# Patient Record
Sex: MALE | Race: WHITE | Employment: FULL TIME | URBAN - METROPOLITAN AREA
[De-identification: names, ages, dates, MRNs, and addresses within clinical notes are randomized per-mention and may not be internally consistent; named-entity substitution may affect disease eponyms.]

---

## 2017-01-25 ENCOUNTER — OFFICE VISIT (OUTPATIENT)
Dept: FAMILY MEDICINE CLINIC | Age: 57
End: 2017-01-25

## 2017-01-25 VITALS
TEMPERATURE: 97.8 F | HEART RATE: 63 BPM | SYSTOLIC BLOOD PRESSURE: 143 MMHG | OXYGEN SATURATION: 99 % | RESPIRATION RATE: 18 BRPM | DIASTOLIC BLOOD PRESSURE: 88 MMHG | HEIGHT: 70 IN | WEIGHT: 251 LBS | BODY MASS INDEX: 35.93 KG/M2

## 2017-01-25 DIAGNOSIS — Z00.00 HEALTH CARE MAINTENANCE: Primary | ICD-10-CM

## 2017-01-25 DIAGNOSIS — M10.9 GOUT, UNSPECIFIED CAUSE, UNSPECIFIED CHRONICITY, UNSPECIFIED SITE: ICD-10-CM

## 2017-01-25 DIAGNOSIS — E78.9 LIPID DISORDER: ICD-10-CM

## 2017-01-25 DIAGNOSIS — Z12.5 PROSTATE CANCER SCREENING: ICD-10-CM

## 2017-01-25 DIAGNOSIS — Z12.11 COLON CANCER SCREENING: ICD-10-CM

## 2017-01-25 DIAGNOSIS — I10 ESSENTIAL HYPERTENSION: ICD-10-CM

## 2017-01-25 DIAGNOSIS — R73.9 HYPERGLYCEMIA: ICD-10-CM

## 2017-01-25 LAB
ALBUMIN UR QL STRIP: 150 MG/L
CREATININE, URINE POC: 100 MG/DL
MICROALBUMIN/CREAT RATIO POC: >300 MG/G

## 2017-01-25 RX ORDER — LISINOPRIL 20 MG/1
TABLET ORAL
Qty: 90 TAB | Refills: 3 | Status: SHIPPED | OUTPATIENT
Start: 2017-01-25 | End: 2017-08-31 | Stop reason: ALTCHOICE

## 2017-01-25 RX ORDER — PRAVASTATIN SODIUM 40 MG/1
40 TABLET ORAL
Qty: 90 TAB | Refills: 3 | Status: SHIPPED | OUTPATIENT
Start: 2017-01-25 | End: 2017-08-31 | Stop reason: SDUPTHER

## 2017-01-25 RX ORDER — GUAIFENESIN 100 MG/5ML
81 LIQUID (ML) ORAL DAILY
Qty: 90 TAB | Refills: 3
Start: 2017-01-25 | End: 2019-05-24

## 2017-01-25 RX ORDER — ALLOPURINOL 300 MG/1
TABLET ORAL
Qty: 90 TAB | Refills: 3 | Status: SHIPPED | OUTPATIENT
Start: 2017-01-25 | End: 2017-08-31 | Stop reason: SDUPTHER

## 2017-01-25 NOTE — PROGRESS NOTES
Rylan Short is a 64 y.o. male      Issues discussed today include:        Data reviewed or ordered today:  Labs today    WELLNESS 2017    PSA:  2017  AAA screen:   never smoker    Hearing screen:   done  Vision screening:   done  Depression screening:   done  Fall assessment:   done    Colonoscopy:  Done age 46 normal per patient  FOBT:  2017  TDAP:  2014  Flu shot:  Avani Berry   Eye exam:  2016  EK    FTF for DME:  done:  none  Advanced directive:  Full code  Specialists:  Holly Bolanos    In general, I advise patients to be as active as possible. I believe exercise is the key to long life and good health. The current recommendation is for individuals to exercise for 150 minutes each week (in other words 30 minutes 5 days a week). Exercise should be vigorous enough to work up a sweat. These activities include brisk walking, running, tennis, swimming, weight-lifting, etc.     I usually tell folks that work is work and exercise is exercise. Each of these activities has a different goal.  Even though you may be active at work, it may not be aerobically adequate. So build dedicated exercise time into your weekly routine. If a patient drinks alcohol, I suggest that a male drink only 2 beers (or glasses of wine, or shots of liquor) in any 24 hour period ( and not daily). For females, the limits are one drink per 24 hours (and not daily). After these limits, the toxic effects of alcohol consumption start to manifest.     Avoid tobacco products. I may provide separate information discussing specific smoking cessation instructions if needed. I suggest a wellness exam yearly during your birth month to update health maintenance issues such as fasting labs, EKGs and other studies, appropriate cancer screenings,  immunizations, etc.      I suggest a yearly flu shot    Please call Rojelio Oakes to help arrange and authorize any tests or referrals.   Her # is [phone]         Other problems include:  Patient Active Problem List   Diagnosis Code    Appendicitis K37    Hernia K46.9    FH: CAD (coronary artery disease) Z82.49    HTN (hypertension) I10    Gout M10.9    Cataract H26.9    History of normal resting EKG [id number]    S/P colonoscopy Z98.890    Microalbuminuria R80.9    Hyperglycemia R73.9    Rheumatoid factor positive R76.8    Lipid disorder E78.9       Medications:  Current Outpatient Prescriptions   Medication Sig Dispense Refill    lisinopril (PRINIVIL, ZESTRIL) 20 mg tablet TAKE 1 TABLET BY MOUTH EVERY DAY 90 Tab 3    allopurinol (ZYLOPRIM) 300 mg tablet TAKE 1 TABLET BY MOUTH EVERY DAY 90 Tab 3    pravastatin (PRAVACHOL) 40 mg tablet Take 1 Tab by mouth nightly. 90 Tab 3    aspirin 81 mg chewable tablet Take 1 Tab by mouth daily. 90 Tab 3       Allergies:  No Known Allergies    LMP:  No LMP for male patient. Social History     Social History    Marital status:      Spouse name: N/A    Number of children: N/A    Years of education: N/A     Occupational History    Not on file. Social History Main Topics    Smoking status: Never Smoker    Smokeless tobacco: Never Used    Alcohol use No    Drug use: No    Sexual activity: Not on file     Other Topics Concern    Not on file     Social History Narrative         Family History   Problem Relation Age of Onset    Stroke Father      Other family history:  MI (dad (age 37)    Meaningful use:  done      ROS:  Headaches:  no  Chest Pain:  no  SOB:  no  Fevers:  no  Other significant ROS:  No falls/depression, h/o RIH repair (aches sometimes)    No LMP for male patient.     Physical Exam  Visit Vitals    /88    Pulse 63    Temp 97.8 °F (36.6 °C) (Oral)    Resp 18    Ht 5' 10\" (1.778 m)    Wt 251 lb (113.9 kg)    SpO2 99%    BMI 36.01 kg/m2     BP Readings from Last 3 Encounters:   01/25/17 143/88   02/18/16 138/86   12/22/15 (!) 132/95     Constitutional:  Appears well,  No Acute Distress, Vitals noted  Psychiatric: Affect normal, Alert and cooperative, Oriented to person/place/time    Eyes:   Pupils equally round and reactive, EOMI, conjunctiva clear, eyelids normal  ENT:   External ears and nose normal/lips, teeth=OK/gums normal, TMs and Orophyarynx normal  Neck:   general inspection and Thyroid normal.  No abnormal cervical or supraclavicular nodes    Lungs:   clear to auscultation, good respiratory effort  Heart: Ausculation normal.  Regular rhythm. No cardiac murmurs. o carotid bruits or palpable thrills  Chest wall normal  Abd:  benign  Extremities:   without edema, good peripheral pulses  Skin:   Warm to palpation, without rashes, bruising, or suspicious lesions   : Both testicles descended, no hernia,   we discussed the USPSTF guidelines on prostate cancer screening and physical exam of the prostate was not done          Assessment:    Patient Active Problem List   Diagnosis Code    Appendicitis K37    Hernia K46.9    FH: CAD (coronary artery disease) Z82.49    HTN (hypertension) I10    Gout M10.9    Cataract H26.9    History of normal resting EKG [id number]    S/P colonoscopy Z98.890    Microalbuminuria R80.9    Hyperglycemia R73.9    Rheumatoid factor positive R76.8    Lipid disorder E78.9       Today's diagnoses are:    ICD-10-CM ICD-9-CM    1. Health care maintenance Z00.00 V70.0    2. Lipid disorder E78.9 272.9 CHOLESTEROL, HDL      CHOLESTEROL, TOTAL      LDL, DIRECT      ALT      pravastatin (PRAVACHOL) 40 mg tablet    he is NOT on statin   3. Hyperglycemia R73.9 790.29 HEMOGLOBIN A1C WITH EAG      HGB & HCT   4. Essential hypertension U47 741.3 METABOLIC PANEL, BASIC      AMB POC URINE, MICROALBUMIN, SEMIQUANT (3 RESULTS)      lisinopril (PRINIVIL, ZESTRIL) 20 mg tablet      aspirin 81 mg chewable tablet    controlled, refills   5. Colon cancer screening Z12.11 V76.51 AMB POC FECAL BLOOD, OCCULT, QL 3 CARDS   6. Prostate cancer screening Z12.5 V76.44 PSA DIAGNOSTIC (PROSTATIC SPECIFIC AG)   7. Lipid disorder E78.9 272.9 CHOLESTEROL, HDL      CHOLESTEROL, TOTAL      LDL, DIRECT      ALT      pravastatin (PRAVACHOL) 40 mg tablet   8. Gout, unspecified cause, unspecified chronicity, unspecified site M10.9 274.9 allopurinol (ZYLOPRIM) 300 mg tablet       Plan:  Orders Placed This Encounter    CHOLESTEROL, HDL    CHOLESTEROL, TOTAL    LDL, DIRECT    HEMOGLOBIN A1C WITH EAG    METABOLIC PANEL, BASIC    ALT    HGB & HCT    PSA - PROSTATE SPECIFIC AG    AMB POC URINE, MICROALBUMIN, SEMIQUANT (3 RESULTS)    AMB FECAL OCCULT BLOOD QL-3 CARDS    lisinopril (PRINIVIL, ZESTRIL) 20 mg tablet     Sig: TAKE 1 TABLET BY MOUTH EVERY DAY     Dispense:  90 Tab     Refill:  3    allopurinol (ZYLOPRIM) 300 mg tablet     Sig: TAKE 1 TABLET BY MOUTH EVERY DAY     Dispense:  90 Tab     Refill:  3    pravastatin (PRAVACHOL) 40 mg tablet     Sig: Take 1 Tab by mouth nightly. Dispense:  90 Tab     Refill:  3    aspirin 81 mg chewable tablet     Sig: Take 1 Tab by mouth daily. Dispense:  90 Tab     Refill:  3       See patient instructions  Patient Instructions   Labs today    Return your stool cards when ready    Focus on regular exercise (150 minutes each week) and healthy eating. Eat more fruits and vegetables. Eat more protein (egg whites, beans, and nuts you know you tolerate) and less carbohydrates (white bread, white rice, white pasta, white potatoes, sodas, and sweets). Eat appropriately small portion sizes.             refresh note:  done    AVS Printed:  done

## 2017-01-25 NOTE — PATIENT INSTRUCTIONS
Labs today    Return your stool cards when ready    Focus on regular exercise (150 minutes each week) and healthy eating. Eat more fruits and vegetables. Eat more protein (egg whites, beans, and nuts you know you tolerate) and less carbohydrates (white bread, white rice, white pasta, white potatoes, sodas, and sweets). Eat appropriately small portion sizes.

## 2017-01-25 NOTE — LETTER
1/26/2017 12:30 PM 
 
Mr. Kym Hill 
[address] [address] Dear Kym Hill: 
 
Please find your most recent results below. Resulted Orders CHOLESTEROL, HDL Result Value Ref Range HDL Cholesterol 42 >39 mg/dL Narrative Performed at:  [address]  [id number] : Nain Waller MD, Phone:  [phone] CHOLESTEROL, TOTAL Result Value Ref Range Cholesterol, total 185 100 - 199 mg/dL Narrative Performed at:  [address]  [id number] : Nain Waller MD, Phone:  [phone] LDL, DIRECT Result Value Ref Range LDL,Direct 134 (H) 0 - 99 mg/dL Narrative Performed at:  [address]  [id number] : Nain Waller MD, Phone:  [phone] HEMOGLOBIN A1C WITH EAG Result Value Ref Range Hemoglobin A1c 5.9 (H) 4.8 - 5.6 % Comment:  
            Pre-diabetes: 5.7 - 6.4 Diabetes: >6.4 Glycemic control for adults with diabetes: <7.0 Estimated average glucose 123 mg/dL Narrative Performed at:  [address]  [id number] : Nain Waller MD, Phone:  [phone] METABOLIC PANEL, BASIC Result Value Ref Range Glucose 94 65 - 99 mg/dL BUN 18 6 - 24 mg/dL Creatinine 1.27 0.76 - 1.27 mg/dL GFR est non-AA 63 >59 mL/min/1.73 GFR est AA 73 >59 mL/min/1.73  
 BUN/Creatinine ratio 14 9 - 20 Sodium 139 134 - 144 mmol/L Potassium 5.1 3.5 - 5.2 mmol/L Chloride 98 96 - 106 mmol/L  
 CO2 28 18 - 29 mmol/L Calcium 9.2 8.7 - 10.2 mg/dL Narrative Performed at:  [address]  [id number] : Nain Waller MD, Phone:  [phone] ALT Result Value Ref Range ALT 19 0 - 44 IU/L Narrative Performed at:  [address]  [id number] : Carlos Alberto Goddard MD, Phone:  [phone] AMB POC URINE, MICROALBUMIN, SEMIQUANT (3 RESULTS) Result Value Ref Range ALBUMIN, URINE  Negative mg/L  
 CREATININE, URINE  mg/dL Microalbumin/creat ratio (POC) >300 mg/g Comment:  
   high abnormal  
HGB & HCT Result Value Ref Range HGB 16.0 12.6 - 17.7 g/dL HCT 47.7 37.5 - 51.0 % Narrative Performed at:  [address]  [id number] : Carlos Alberto Goddard MD, Phone:  [phone] PSA DIAGNOSTIC (PROSTATIC SPECIFIC AG) Result Value Ref Range Prostate Specific Ag 1.3 0.0 - 4.0 ng/mL Comment:  
   Roche ECLIA methodology. According to the American Urological Association, Serum PSA should 
decrease and remain at undetectable levels after radical 
prostatectomy. The AUA defines biochemical recurrence as an initial 
PSA value 0.2 ng/mL or greater followed by a subsequent confirmatory PSA value 0.2 ng/mL or greater. Values obtained with different assay methods or kits cannot be used 
interchangeably. Results cannot be interpreted as absolute evidence 
of the presence or absence of malignant disease. Narrative Performed at:  [address]  [id number] : Carlos Alberto Goddard MD, Phone:  [phone] CVD REPORT Result Value Ref Range INTERPRETATION Note Comment:  
   Supplement report is available. Narrative Performed at:  [address] [place] [address] [address] [place] [address]  [id number] : Thang Ruelas PhD, Phone:  [phone] RECOMMENDATIONS: 
 
Your bad cholesterol is up.  You have a 9.2% chance of developing heart disease based on your current risks.  Because of this I suggest:  Take the pravastatin 40 mg as we discussed. Sincerely, Isamar Combs MD

## 2017-01-25 NOTE — MR AVS SNAPSHOT
Visit Information Date & Time Provider Department Dept. Phone Encounter #  
 1/25/2017 10:00 AM Ventura Kimball MD [address] [place] [address] [phone] [id number] Upcoming Health Maintenance Date Due FOBT Q 1 YEAR AGE 50-75 1/25/2018 DTaP/Tdap/Td series (2 - Td) 4/29/2024 Allergies as of 1/25/2017  Review Complete On: 1/25/2017 By: Priscilla March No Known Allergies Current Immunizations  Reviewed on 12/22/2015 Name Date Tdap 4/29/2014 Not reviewed this visit You Were Diagnosed With   
  
 Codes Comments Health care maintenance    -  Primary ICD-10-CM: Z00.00 ICD-9-CM: V70.0 Lipid disorder     ICD-10-CM: E78.9 ICD-9-CM: 272.9 he is NOT on statin Hyperglycemia     ICD-10-CM: R73.9 ICD-9-CM: 790.29 Essential hypertension     ICD-10-CM: I10 
ICD-9-CM: 401.9 controlled, refills Colon cancer screening     ICD-10-CM: Z12.11 ICD-9-CM: V76.51 Vitals BP Pulse Temp Resp Height(growth percentile) Weight(growth percentile) 143/88 63 97.8 °F (36.6 °C) (Oral) 18 5' 10\" (1.778 m) 251 lb (113.9 kg) SpO2 BMI Smoking Status 99% 36.01 kg/m2 Never Smoker BMI and BSA Data Body Mass Index Body Surface Area 36.01 kg/m 2 2.37 m 2 Preferred Pharmacy Pharmacy Name Phone [facility] [address]  [facility] [phone] Your Updated Medication List  
  
   
This list is accurate as of: 1/25/17 10:55 AM.  Always use your most recent med list.  
  
  
  
  
 allopurinol 300 mg tablet Commonly known as:  ZYLOPRIM  
TAKE 1 TABLET BY MOUTH EVERY DAY  
  
 lisinopril 20 mg tablet Commonly known as:  PRINIVIL, ZESTRIL  
TAKE 1 TABLET BY MOUTH EVERY DAY  
  
 meloxicam 7.5 mg tablet Commonly known as:  MOBIC Take 1 Tab by mouth daily. take with food  
  
 pravastatin 40 mg tablet Commonly known as:  PRAVACHOL  
 Take 1 Tab by mouth nightly. We Performed the Following ALT [id number] CPT(R)] AMB POC FECAL BLOOD, OCCULT, QL 3 CARDS [76787 CPT(R)] AMB POC URINE, MICROALBUMIN, SEMIQUANT (3 RESULTS) [37998 CPT(R)] CHOLESTEROL, HDL [id number] CPT(R)] CHOLESTEROL, TOTAL [47810 CPT(R)] HEMOGLOBIN A1C WITH EAG [03100 CPT(R)] HGB & HCT [77840 CPT(R)] LDL, DIRECT [id number] CPT(R)] METABOLIC PANEL, BASIC [41558 CPT(R)] Patient Instructions Labs today Return your stool cards when ready Focus on regular exercise (150 minutes each week) and healthy eating. Eat more fruits and vegetables. Eat more protein (egg whites, beans, and nuts you know you tolerate) and less carbohydrates (white bread, white rice, white pasta, white potatoes, sodas, and sweets). Eat appropriately small portion sizes. Introducing [facility]! Dear Judy Shirley: 
Thank you for requesting a [employer]unched account. Our records indicate that you already have an active FreeLunched account. You can access your account anytime at [email or web address] Did you know that you can access your hospital and ER discharge instructions at any time in FreeLunched? You can also review all of your test results from your hospital stay or ER visit. Additional Information If you have questions, please visit the Frequently Asked Questions section of the FreeLunched website at [email or web address] Remember, FreeLunched is NOT to be used for urgent needs. For medical emergencies, dial 911. Now available from your iPhone and Android! Please provide this summary of care documentation to your next provider. Your primary care clinician is listed as Jefferson Kelly. If you have any questions after today's visit, please call [phone].

## 2017-01-26 LAB
ALT SERPL-CCNC: 19 IU/L (ref 0–44)
BUN SERPL-MCNC: 18 MG/DL (ref 6–24)
BUN/CREAT SERPL: 14 (ref 9–20)
CALCIUM SERPL-MCNC: 9.2 MG/DL (ref 8.7–10.2)
CHLORIDE SERPL-SCNC: 98 MMOL/L (ref 96–106)
CHOLEST SERPL-MCNC: 185 MG/DL (ref 100–199)
CO2 SERPL-SCNC: 28 MMOL/L (ref 18–29)
CREAT SERPL-MCNC: 1.27 MG/DL (ref 0.76–1.27)
EST. AVERAGE GLUCOSE BLD GHB EST-MCNC: 123 MG/DL
GLUCOSE SERPL-MCNC: 94 MG/DL (ref 65–99)
HBA1C MFR BLD: 5.9 % (ref 4.8–5.6)
HCT VFR BLD AUTO: 47.7 % (ref 37.5–51)
HDLC SERPL-MCNC: 42 MG/DL
HGB BLD-MCNC: 16 G/DL (ref 12.6–17.7)
INTERPRETATION, 910389: NORMAL
LDLC SERPL DIRECT ASSAY-MCNC: 134 MG/DL (ref 0–99)
POTASSIUM SERPL-SCNC: 5.1 MMOL/L (ref 3.5–5.2)
PSA SERPL-MCNC: 1.3 NG/ML (ref 0–4)
SODIUM SERPL-SCNC: 139 MMOL/L (ref 134–144)

## 2017-01-26 NOTE — PROGRESS NOTES
Your bad cholesterol is up. You have a 9.2% chance of developing heart disease based on your current risks. Because of this I suggest:  Take the pravastatin 40 mg as we discussed.

## 2017-08-31 ENCOUNTER — OFFICE VISIT (OUTPATIENT)
Dept: FAMILY MEDICINE CLINIC | Age: 57
End: 2017-08-31

## 2017-08-31 VITALS
WEIGHT: 241.8 LBS | HEART RATE: 69 BPM | HEIGHT: 70 IN | TEMPERATURE: 98 F | DIASTOLIC BLOOD PRESSURE: 103 MMHG | RESPIRATION RATE: 18 BRPM | OXYGEN SATURATION: 97 % | BODY MASS INDEX: 34.62 KG/M2 | SYSTOLIC BLOOD PRESSURE: 152 MMHG

## 2017-08-31 DIAGNOSIS — I10 ESSENTIAL HYPERTENSION: ICD-10-CM

## 2017-08-31 DIAGNOSIS — E78.9 LIPID DISORDER: ICD-10-CM

## 2017-08-31 DIAGNOSIS — H26.9 CATARACT OF LEFT EYE, UNSPECIFIED CATARACT TYPE: Primary | ICD-10-CM

## 2017-08-31 DIAGNOSIS — M10.9 GOUT, UNSPECIFIED CAUSE, UNSPECIFIED CHRONICITY, UNSPECIFIED SITE: ICD-10-CM

## 2017-08-31 DIAGNOSIS — Z13.1 SCREENING FOR DIABETES MELLITUS: ICD-10-CM

## 2017-08-31 DIAGNOSIS — R06.83 SNORING: ICD-10-CM

## 2017-08-31 RX ORDER — LOSARTAN POTASSIUM 100 MG/1
100 TABLET ORAL DAILY
Qty: 90 TAB | Refills: 3 | Status: SHIPPED | OUTPATIENT
Start: 2017-08-31 | End: 2018-09-10 | Stop reason: SDUPTHER

## 2017-08-31 RX ORDER — ALLOPURINOL 300 MG/1
TABLET ORAL
Qty: 90 TAB | Refills: 3 | Status: SHIPPED | OUTPATIENT
Start: 2017-08-31 | End: 2018-01-27

## 2017-08-31 RX ORDER — PRAVASTATIN SODIUM 40 MG/1
40 TABLET ORAL
Qty: 90 TAB | Refills: 3 | Status: SHIPPED | OUTPATIENT
Start: 2017-08-31 | End: 2018-01-30 | Stop reason: ALTCHOICE

## 2017-08-31 NOTE — PROGRESS NOTES
You have protein in your urine. Start the losartan 100 mg daily as we discussed. This should help protect your kidneys.

## 2017-08-31 NOTE — PROGRESS NOTES
Chief Complaint   Patient presents with    Pre-op Exam     eye surgery     Pt here for pre-op exam for surgery to left eye on 9/7/17. Pt has no acute symptoms at this time.

## 2017-08-31 NOTE — PATIENT INSTRUCTIONS
Stop lisinopril. Take losartan 100 mg daily for blood pressure    Continue pravastatin 40 mg daily for cholesterol    Continue allopurinol 300 mg for gout prevention    Focus on regular exercise (150 minutes each week) and healthy eating. Eat more fruits and vegetables. Eat more protein (egg whites, beans, and nuts you know you tolerate) and less carbohydrates (white bread, white rice, white pasta, white potatoes, sodas, and sweets). Eat appropriately small portion sizes. Obtain sleep evaluation with Dr. Jamal Todd #[phone]    Please call Ai Phillip to help arrange and authorize any tests and/or referrals.   Her # is [phone]     Central Scheduling at [facility] is #[phone]    OK for cataract surgery    Recheck BP in 2-4 weeks

## 2017-08-31 NOTE — PROGRESS NOTES
Sheryl Storm is a 62 y.o. male      Issues discussed today include:        Signs and symptoms:  Cataract OS  Duration:  years  Context:  He had right eye done in 2012  Location:  OS  Quality:  Blurred vision  Severity:  annoying  Timing: For surgery 9/7/2017 Dr. Mariella Conklin  Modifying factors:  He does not have a form today    Data reviewed or ordered today:  Will fill out form once available    Other problems include:  Patient Active Problem List   Diagnosis Code    Appendicitis K37    Hernia K46.9    FH: CAD (coronary artery disease) Z82.49    HTN (hypertension) I10    Gout M10.9    Cataract H26.9    History of normal resting EKG [id number]    S/P colonoscopy Z98.890    Microalbuminuria R80.9    Hyperglycemia R73.9    Rheumatoid factor positive R76.8    Lipid disorder E78.9       Medications:  Current Outpatient Prescriptions   Medication Sig Dispense Refill    losartan (COZAAR) 100 mg tablet Take 1 Tab by mouth daily. 90 Tab 3    pravastatin (PRAVACHOL) 40 mg tablet Take 1 Tab by mouth nightly. 90 Tab 3    allopurinol (ZYLOPRIM) 300 mg tablet TAKE 1 TABLET BY MOUTH EVERY DAY 90 Tab 3    aspirin 81 mg chewable tablet Take 1 Tab by mouth daily. 90 Tab 3       Allergies:  No Known Allergies    LMP:  No LMP for male patient. Social History     Social History    Marital status:      Spouse name: N/A    Number of children: N/A    Years of education: N/A     Occupational History    Not on file. Social History Main Topics    Smoking status: Never Smoker    Smokeless tobacco: Never Used    Alcohol use No    Drug use: No    Sexual activity: Not on file     Other Topics Concern    Not on file     Social History Narrative         Family History   Problem Relation Age of Onset    Stroke Father          Meaningful use:  done      ROS:  Headaches:  no  Chest Pain:  no  SOB:  no  Fevers:  no  Falls:  no  anxiety/depression/losing interest in doing things that were previously enjoyed:  no.   PHQ2 = 0  Other significant ROS:  Snoring. StopBang =7. He has been off his Rx for a few days    No LMP for male patient. Physical Exam  Visit Vitals    BP (!) 152/103    Pulse 69    Temp 98 °F (36.7 °C) (Oral)    Resp 18    Ht 5' 10\" (1.778 m)    Wt 241 lb 12.8 oz (109.7 kg)    SpO2 97%    BMI 34.69 kg/m2     BP Readings from Last 3 Encounters:   08/31/17 (!) 152/103   01/25/17 143/88   02/18/16 138/86     Constitutional:  Appears well,  No Acute Distress, Vitals noted, neck 19 inches  Psychiatric:   Affect normal, Alert and cooperative, Oriented to person/place/time    Eyes:   Pupils equally round and reactive, EOMI, conjunctiva clear, eyelids normal  ENT:   External ears and nose normal/lips, teeth=OK/gums normal, TMs and Orophyarynx normal  Neck:   general inspection and Thyroid normal.  No abnormal cervical or supraclavicular nodes    Lungs:   clear to auscultation, good respiratory effort  Heart: Ausculation normal.  Regular rhythm. No cardiac murmurs. No carotid bruits or palpable thrills  Chest wall normal  Abd:  benign  Extremities:   without edema, good peripheral pulses  Skin:   Warm to palpation, without rashes, bruising, or suspicious lesions           Assessment:    Patient Active Problem List   Diagnosis Code    Appendicitis K37    Hernia K46.9    FH: CAD (coronary artery disease) Z82.49    HTN (hypertension) I10    Gout M10.9    Cataract H26.9    History of normal resting EKG [id number]    S/P colonoscopy Z98.890    Microalbuminuria R80.9    Hyperglycemia R73.9    Rheumatoid factor positive R76.8    Lipid disorder E78.9       Today's diagnoses are:    ICD-10-CM ICD-9-CM    1. Essential hypertension I10 401.9 losartan (COZAAR) 100 mg tablet      METABOLIC PANEL, BASIC    not optimal OFF Rx   2. Lipid disorder E78.9 272.9 pravastatin (PRAVACHOL) 40 mg tablet      ALT      LDL, DIRECT    he is NOT on statin   3.  Gout, unspecified cause, unspecified chronicity, unspecified site M10.9 274.9 allopurinol (ZYLOPRIM) 300 mg tablet   4. Snoring R06.83 786.09        Plan:  Orders Placed This Encounter    METABOLIC PANEL, BASIC    ALT    LDL, DIRECT    losartan (COZAAR) 100 mg tablet     Sig: Take 1 Tab by mouth daily. Dispense:  90 Tab     Refill:  3     This replaces lisinopril    pravastatin (PRAVACHOL) 40 mg tablet     Sig: Take 1 Tab by mouth nightly. Dispense:  90 Tab     Refill:  3    allopurinol (ZYLOPRIM) 300 mg tablet     Sig: TAKE 1 TABLET BY MOUTH EVERY DAY     Dispense:  90 Tab     Refill:  3       See patient instructions  There are no Patient Instructions on file for this visit. refresh note:  done    AVS Printed:  done    I have reviewed/discussed the above normal BMI with the patient. I have recommended the following interventions: encourage exercise and monitor weight . Danny Sibley

## 2017-08-31 NOTE — LETTER
8/31/2017 2:56 PM 
 
Mr. Guilford Opitz 
[address] [facility] [address] Dear Guilford Opitz: 
 
Please find your most recent results below. Resulted Orders AMB POC URINE, MICROALBUMIN, SEMIQUANT (3 RESULTS) Result Value Ref Range ALBUMIN, URINE  Negative mg/L  
 CREATININE, URINE  mg/dL Microalbumin/creat ratio (POC) >300 MG/G Comment:  
   High Abnormal  
 
 
RECOMMENDATIONS: 
 
You have protein in your urine.  Start the losartan 100 mg daily as we discussed.  This should help protect your kidneys. Sincerely, Jluis Goel MD

## 2017-08-31 NOTE — LETTER
9/6/2017 1:19 PM 
 
Mr. Jayne Sky 
[address] [facility] [address] Dear Jayne Sky: 
 
Please find your most recent results below. Resulted Orders METABOLIC PANEL, BASIC Result Value Ref Range Glucose 106 (H) 65 - 99 mg/dL BUN 21 6 - 24 mg/dL Creatinine 1.22 0.76 - 1.27 mg/dL GFR est non-AA 65 >59 mL/min/1.73 GFR est AA 76 >59 mL/min/1.73  
 BUN/Creatinine ratio 17 9 - 20 Sodium 140 134 - 144 mmol/L Potassium 5.3 (H) 3.5 - 5.2 mmol/L Chloride 99 96 - 106 mmol/L  
 CO2 23 18 - 29 mmol/L Calcium 9.7 8.7 - 10.2 mg/dL Narrative Performed at:  [address]  [id number] : Jayla Zhang MD, Phone:  [phone] ALT Result Value Ref Range ALT (SGPT) 20 0 - 44 IU/L Narrative Performed at:  [address]  [id number] : Jayla Zhang MD, Phone:  [phone] LDL, DIRECT Result Value Ref Range LDL,Direct 141 (H) 0 - 99 mg/dL Narrative Performed at:  [address]  [id number] : Jayla Zhang MD, Phone:  [phone] AMB POC URINE, MICROALBUMIN, SEMIQUANT (3 RESULTS) Result Value Ref Range ALBUMIN, URINE  Negative mg/L  
 CREATININE, URINE  mg/dL Microalbumin/creat ratio (POC) >300 MG/G Comment:  
   High Abnormal  
HEMOGLOBIN A1C WITH EAG Result Value Ref Range Hemoglobin A1c 5.5 4.8 - 5.6 % Comment:  
            Pre-diabetes: 5.7 - 6.4 Diabetes: >6.4 Glycemic control for adults with diabetes: <7.0 Estimated average glucose 111 mg/dL Narrative Performed at:  [address]  [id number] : Jayla Zhang MD, Phone:  [phone] RECOMMENDATIONS: 
 
Your sugars are borderline. Focus on regular exercise (150 minutes each week) and healthy eating.  Eat more fruits and vegetables.  Eat more protein (egg whites, beans, and nuts you know you tolerate) and less carbohydrates (white bread, white rice, white pasta, white potatoes, sodas, and sweets).  Eat appropriately small portion sizes. Drink plenty of fluids.  Recheck labs in 2-3 weeks Sincerely, Sabrina Butterfield MD

## 2017-08-31 NOTE — LETTER
8/31/2017 10:24 AM 
 
Mr. Elizabeth Lee [address] [address] Body mass index is 34.69 kg/(m^2). Focus on regular exercise (150 minutes each week) and healthy eating. Eat more fruits and vegetables. Eat more protein (egg whites, beans, and nuts you know you tolerate) and less carbohydrates (white bread, white rice, white pasta, white potatoes, sodas, and sweets). Eat appropriately small portion sizes. See Dr. Tonja Villa for sleep evaluation. #[phone]. Sincerely, Thu Love MD

## 2017-09-01 LAB
ALT SERPL-CCNC: 20 IU/L (ref 0–44)
BUN SERPL-MCNC: 21 MG/DL (ref 6–24)
BUN/CREAT SERPL: 17 (ref 9–20)
CALCIUM SERPL-MCNC: 9.7 MG/DL (ref 8.7–10.2)
CHLORIDE SERPL-SCNC: 99 MMOL/L (ref 96–106)
CO2 SERPL-SCNC: 23 MMOL/L (ref 18–29)
CREAT SERPL-MCNC: 1.22 MG/DL (ref 0.76–1.27)
EST. AVERAGE GLUCOSE BLD GHB EST-MCNC: 111 MG/DL
GLUCOSE SERPL-MCNC: 106 MG/DL (ref 65–99)
HBA1C MFR BLD: 5.5 % (ref 4.8–5.6)
LDLC SERPL DIRECT ASSAY-MCNC: 141 MG/DL (ref 0–99)
POTASSIUM SERPL-SCNC: 5.3 MMOL/L (ref 3.5–5.2)
SODIUM SERPL-SCNC: 140 MMOL/L (ref 134–144)

## 2017-09-03 DIAGNOSIS — R73.9 HYPERGLYCEMIA: Primary | ICD-10-CM

## 2017-09-03 DIAGNOSIS — E87.5 HYPERKALEMIA: ICD-10-CM

## 2017-09-03 NOTE — PROGRESS NOTES
Your sugars are borderline. Focus on regular exercise (150 minutes each week) and healthy eating. Eat more fruits and vegetables. Eat more protein (egg whites, beans, and nuts you know you tolerate) and less carbohydrates (white bread, white rice, white pasta, white potatoes, sodas, and sweets). Eat appropriately small portion sizes. Drink plenty of fluids.   Recheck labs in 2-3 weeks

## 2017-11-03 ENCOUNTER — TELEPHONE (OUTPATIENT)
Dept: FAMILY MEDICINE CLINIC | Age: 57
End: 2017-11-03

## 2017-11-03 NOTE — TELEPHONE ENCOUNTER
Also checked through Norma Hernandez and there was no phone call made to her since August.      Called and left voice mail that no documentation could be found at this office.

## 2017-11-03 NOTE — TELEPHONE ENCOUNTER
----- Message from Murray Santana sent at 11/3/2017  9:23 AM EDT -----  Regarding: /Telephone  Pt returning a call.     Best contact for the pt is [phone]

## 2017-12-14 ENCOUNTER — OFFICE VISIT (OUTPATIENT)
Dept: SLEEP MEDICINE | Age: 57
End: 2017-12-14

## 2017-12-14 VITALS
WEIGHT: 214.3 LBS | SYSTOLIC BLOOD PRESSURE: 138 MMHG | HEART RATE: 66 BPM | OXYGEN SATURATION: 97 % | DIASTOLIC BLOOD PRESSURE: 83 MMHG | BODY MASS INDEX: 30.68 KG/M2 | HEIGHT: 70 IN

## 2017-12-14 DIAGNOSIS — G47.33 OSA (OBSTRUCTIVE SLEEP APNEA): Primary | ICD-10-CM

## 2017-12-14 DIAGNOSIS — I10 ESSENTIAL HYPERTENSION: ICD-10-CM

## 2017-12-14 NOTE — PATIENT INSTRUCTIONS
[address] [facility] [place]. [place], [address]  Tel.  [phone]  Fax. [address] [facility] [address]  [place], [address] [facility]  Tel.  [phone]  Fax. [phone] [address] [facility] [address]  Tel.  [phone]  Fax. [phone]     Sleep Apnea: After Your Visit  Your Care Instructions  Sleep apnea occurs when you frequently stop breathing for 10 seconds or longer during sleep. It can be mild to severe, based on the number of times per hour that you stop breathing or have slowed breathing. Blocked or narrowed airways in your nose, mouth, or throat can cause sleep apnea. Your airway can become blocked when your throat muscles and tongue relax during sleep. Sleep apnea is common, occurring in 1 out of 20 individuals. Individuals having any of the following characteristics should be evaluated and treated right away due to high risk and detrimental consequences from untreated sleep apnea:  1. Obesity  2. Congestive Heart failure  3. Atrial Fibrillation  4. Uncontrolled Hypertension  5. Type II Diabetes  6. Night-time Arrhythmias  7. Stroke  8. Pulmonary Hypertension  9. High-risk Driving Populations (pilots, truck drivers, etc.)  10. Patients Considering Weight-loss Surgery    How do you know you have sleep apnea? You probably have sleep apnea if you answer 'yes' to 3 or more of the following questions:  S - Have you been told that you Snore? T - Are you often Tired during the day? O - Has anyone Observed you stop breathing while sleeping? P- Do you have (or are being treated for) high blood Pressure? B - Are you obese (Body Mass Index > 35)? A - Is your Age 48years old or older? N - Is your Neck size greater than 16 inches? G - Are you male Gender? A sleep physician can prescribe a breathing device that prevents tissues in the throat from blocking your airway.  Or your doctor may recommend using a dental device (oral breathing device) to help keep your airway open. In some cases, surgery may be needed to remove enlarged tissues in the throat. Follow-up care is a key part of your treatment and safety. Be sure to make and go to all appointments, and call your doctor if you are having problems. It's also a good idea to know your test results and keep a list of the medicines you take. How can you care for yourself at home? · Lose weight, if needed. It may reduce the number of times you stop breathing or have slowed breathing. · Go to bed at the same time every night. · Sleep on your side. It may stop mild apnea. If you tend to roll onto your back, sew a pocket in the back of your pajama top. Put a tennis ball into the pocket, and stitch the pocket shut. This will help keep you from sleeping on your back. · Avoid alcohol and medicines such as sleeping pills and sedatives before bed. · Do not smoke. Smoking can make sleep apnea worse. If you need help quitting, talk to your doctor about stop-smoking programs and medicines. These can increase your chances of quitting for good. · Prop up the head of your bed 4 to 6 inches by putting bricks under the legs of the bed. · Treat breathing problems, such as a stuffy nose, caused by a cold or allergies. · Use a continuous positive airway pressure (CPAP) breathing machine if lifestyle changes do not help your apnea and your doctor recommends it. The machine keeps your airway from closing when you sleep. · If CPAP does not help you, ask your doctor whether you should try other breathing machines. A bilevel positive airway pressure machine has two types of air pressureâone for breathing in and one for breathing out. Another device raises or lowers air pressure as needed while you breathe. · If your nose feels dry or bleeds when using one of these machines, talk with your doctor about increasing moisture in the air. A humidifier may help.   · If your nose is runny or stuffy from using a breathing machine, talk with your doctor about using decongestants or a corticosteroid nasal spray. When should you call for help? Watch closely for changes in your health, and be sure to contact your doctor if:  · You still have sleep apnea even though you have made lifestyle changes. · You are thinking of trying a device such as CPAP. · You are having problems using a CPAP or similar machine. Where can you learn more? Go to [employer]. Enter X791 in the search box to learn more about \"Sleep Apnea: After Your Visit. \"   © 0025-6905 Healthwise, Incorporated. Care instructions adapted under license by [facility] [employer] (which disclaims liability or warranty for this information). This care instruction is for use with your licensed healthcare professional. If you have questions about a medical condition or this instruction, always ask your healthcare professional. Fran Gamboaipes any warranty or liability for your use of this information. PROPER SLEEP HYGIENE    What to avoid  · Do not have drinks with caffeine, such as coffee or black tea, for 8 hours before bed. · Do not smoke or use other types of tobacco near bedtime. Nicotine is a stimulant and can keep you awake. · Avoid drinking alcohol late in the evening, because it can cause you to wake in the middle of the night. · Do not eat a big meal close to bedtime. If you are hungry, eat a light snack. · Do not drink a lot of water close to bedtime, because the need to urinate may wake you up during the night. · Do not read or watch TV in bed. Use the bed only for sleeping and sexual activity. What to try  · Go to bed at the same time every night, and wake up at the same time every morning. Do not take naps during the day. · Keep your bedroom quiet, dark, and cool. · Get regular exercise, but not within 3 to 4 hours of your bedtime. .  · Sleep on a comfortable pillow and mattress.   · If watching the clock makes you anxious, turn it facing away from you so you cannot see the time. · If you worry when you lie down, start a worry book. Well before bedtime, write down your worries, and then set the book and your concerns aside. · Try meditation or other relaxation techniques before you go to bed. · If you cannot fall asleep, get up and go to another room until you feel sleepy. Do something relaxing. Repeat your bedtime routine before you go to bed again. · Make your house quiet and calm about an hour before bedtime. Turn down the lights, turn off the TV, log off the computer, and turn down the volume on music. This can help you relax after a busy day. Drowsy Driving  The [address] [place] [address] Road Traffic Safety Administration cites drowsiness as a causing factor in more than 370,651 police reported crashes annually, resulting in 76,000 injuries and 1,500 deaths. Other surveys suggest 55% of people polled have driven while drowsy in the past year, 23% had fallen asleep but not crashed, 3% crashed, and 2% had and accident due to drowsy driving. Who is at risk? Young Drivers: One study of drowsy driving accidents states that 55% of the drivers were under 25 years. Of those, 75% were male. Shift Workers and Travelers: People who work overnight or travel across time zones frequently are at higher risk of experiencing Circadian Rhythm Disorders. They are trying to work and function when their body is programed to sleep. Sleep Deprived: Lack of sleep has a serious impact on your ability to pay attention or focus on a task. Consistently getting less than the average of 8 hours your body needs creates partial or cumulative sleep deprivation. Untreated Sleep Disorders: Sleep Apnea, Narcolepsy, R.L.S., and other sleep disorders (untreated) prevent a person from getting enough restful sleep. This leads to excessive daytime sleepiness and increases the risk for drowsy driving accidents by up to 7 times.   Medications / Alcohol: Even over the counter medications can cause drowsiness. Medications that impair a drivers attention should have a warning label. Alcohol naturally makes you sleepy and on its own can cause accidents. Combined with excessive drowsiness its effects are amplified. Signs of Drowsy Driving:   * You don't remember driving the last few miles   * You may drift out of your jayy   * You are unable to focus and your thoughts wander   * You may yawn more often than normal   * You have difficulty keeping your eyes open / nodding off   * Missing traffic signs, speeding, or tailgating  Prevention-   Good sleep hygiene, lifestyle and behavioral choices have the most impact on drowsy driving. There is no substitute for sleep and the average person requires 8 hours nightly. If you find yourself driving drowsy, stop and sleep. Consider the sleep hygiene tips provided during your visit as well. Medication Refill Policy: Refills for all medications require 1 week advance notice. Please have your pharmacy fax a refill request. We are unable to fax, or call in \"controled substance\" medications and you will need to pick these prescriptions up from our office. [employer] Activation    Thank you for requesting access to [employer]. Please follow the instructions below to securely access and download your online medical record. [employer] allows you to send messages to your doctor, view your test results, renew your prescriptions, schedule appointments, and more. How Do I Sign Up? 1. In your internet browser, go to [email or web address]. 2. Click on the First Time User? Click Here link in the Sign In box. You will see the New Member Sign Up page. 3. Enter your Coder[employer]dy Access Code exactly as it appears below. You will not need to use this code after youve completed the sign-up process. If you do not sign up before the expiration date, you must request a new code. [employer]dy Access Code:  Activation code not generated  Current [employer]oder[employer]dy Status: Active (This is the date your wooju access code will )    4. Enter the last four digits of your Social Security Number (xxxx) and Date of Birth (mm/dd/yyyy) as indicated and click Submit. You will be taken to the next sign-up page. 5. Create a wooju ID. This will be your wooju login ID and cannot be changed, so think of one that is secure and easy to remember. 6. Create a wooju password. You can change your password at any time. 7. Enter your Password Reset Question and Answer. This can be used at a later time if you forget your password. 8. Enter your e-mail address. You will receive e-mail notification when new information is available in [address] 9. Click Sign Up. You can now view and download portions of your medical record. 10. Click the Download Summary menu link to download a portable copy of your medical information. Additional Information    If you have questions, please call [phone]. Remember, wooju is NOT to be used for urgent needs. For medical emergencies, dial 911.

## 2017-12-14 NOTE — PROGRESS NOTES
[address] [facility] [place]. [place], [address]  Tel.  [phone]  Fax. [address] [facility] [address]  [place], [address] [facility]  Tel.  [phone]  Fax. [phone] [address]   Tel.  [phone]  Fax. [phone]         Subjective:      Julien Allan is an 62 y.o. male referred for evaluation for a sleep disorder. He complains of snoring associated awakening in the middle of the night because of urination and daytime sleepiness. Symptoms began 5 years ago, unchanged since that time. He usually can fall asleep in 5 minutes. Family or house members note snoring. He denies completely or partially paralyzed while falling asleep or waking up. Julien Allan does wake up frequently at night. He is not bothered by waking up too early and left unable to get back to sleep. He actually sleeps about 7 hours at night and wakes up about 3 times during the night. He does not work shifts: Renetta Quintanilla indicates he does get too little sleep at night. His bedtime is 2300. He awakens at 0530. He does not take naps. He has the following observed behaviors: Loud snoring, Light snoring;  . Other remarks:   He denies of an urge to move extremities due to discomfort or other sensation and denies of dream enactment behavior. Star Sleepiness Score: 15 which reflect severe daytime drowsiness. No Known Allergies      Current Outpatient Prescriptions:     losartan (COZAAR) 100 mg tablet, Take 1 Tab by mouth daily. , Disp: 90 Tab, Rfl: 3    pravastatin (PRAVACHOL) 40 mg tablet, Take 1 Tab by mouth nightly., Disp: 90 Tab, Rfl: 3    allopurinol (ZYLOPRIM) 300 mg tablet, TAKE 1 TABLET BY MOUTH EVERY DAY, Disp: 90 Tab, Rfl: 3    aspirin 81 mg chewable tablet, Take 1 Tab by mouth daily. , Disp: 90 Tab, Rfl: 3     He  has a past medical history of Hypertension. He  has a past surgical history that includes appendectomy and hernia repair.     He family history includes Stroke in his father. He  reports that he has never smoked. He has never used smokeless tobacco. He reports that he does not drink alcohol or use illicit drugs. Review of Systems:  Constitutional:  No significant weight loss or weight gain  Eyes:  No blurred vision  CVS:  No significant chest pain  Pulm:  No significant shortness of breath  GI:  No significant nausea or vomiting  :  significant nocturia  Musculoskeletal:  No significant joint pain at night  Skin:  No significant rashes  Neuro:  No significant dizziness   Psych:  No active mood issues    Sleep Review of Systems: notable for no difficulty falling asleep; frequent awakenings at night;  regular dreaming noted; no nightmares ; no early morning headaches; no memory problems; no concentration issues; no history of any automobile or occupational accidents due to daytime drowsiness. Objective:     Visit Vitals    /83    Pulse 66    Ht 5' 10\" (1.778 m)    Wt 214 lb 4.8 oz (97.2 kg)    SpO2 97%    BMI 30.75 kg/m2         General:   Not in acute distress   Eyes:  Anicteric sclerae, no obvious strabismus   Nose:  No obvious nasal septum deviation    Oropharynx:   Class 4 oropharyngeal outlet, thick tongue base, uvula could not be seen due to low-lying soft palate, narrow tonsilo-pharyngeal pilars   Tonsils:   tonsils are not seen due to low-lying soft palate   Neck:   Neck circ. in \"inches\": 16.5; midline trachea   Chest/Lungs:  Equal lung expansion, clear on auscultation    CVS:  Normal rate, regular rhythm; no JVD   Skin:  Warm to touch; no obvious rashes   Neuro:  No focal deficits ; no obvious tremor    Psych:  Normal affect,  normal countenance;          Assessment:       ICD-10-CM ICD-9-CM    1. BON (obstructive sleep apnea) G47.33 327.23 SLEEP STUDY UNATTENDED, 4 CHANNEL   2. Essential hypertension I10 401.9    3.  BMI 30.0-30.9,adult Z68.30 V85.30          Plan:     * The patient currently has a Moderate Risk for having sleep apnea. STOP-BANG score 5.  * Sleep testing was ordered for initial evaluation. * He was provided information on sleep apnea including coresponding risk factors and the importance of proper treatment. * Treatment options if indicated were reviewed today. Patient agrees to a trial of PAP therapy if indicated. * Counseling was provided regarding proper sleep hygiene (including effect of light on sleep) and safe driving. * Effect of sleep disturbance on weight was reviewed. We have recommended a dedicated weight loss through appropriate diet and an exercise regiment as significant weight reduction has been shown to reduce severity of obstructive sleep apnea. * Patient agrees to telephone [phone]  follow-up by myself or lead sleep technologist shortly after sleep study to review results and plan final management.     (patient has given permission for a message to be left regarding test results and further management if patient cannot be cannot be reached directly). Thank you for allowing us to participate in your patient's medical care. We'll keep you updated on these investigations. Chadd Bello MD, FAASM  Electronically signed.  12/14/17

## 2017-12-15 ENCOUNTER — DOCUMENTATION ONLY (OUTPATIENT)
Dept: SLEEP MEDICINE | Age: 57
End: 2017-12-15

## 2017-12-19 ENCOUNTER — TELEPHONE (OUTPATIENT)
Dept: FAMILY MEDICINE CLINIC | Age: 57
End: 2017-12-19

## 2017-12-19 DIAGNOSIS — E78.9 LIPID DISORDER: Primary | ICD-10-CM

## 2017-12-19 DIAGNOSIS — I10 ESSENTIAL HYPERTENSION: ICD-10-CM

## 2017-12-19 DIAGNOSIS — R73.9 HYPERGLYCEMIA: ICD-10-CM

## 2017-12-19 NOTE — TELEPHONE ENCOUNTER
Patient scheduled a CPE with Dr. Jae Bills for 1/30/18 with Dr. Jae Bills. He would like to get his fasting labs done prior. Can you order the labs?  Let patient know when labs are ordered so that we can schedule lab only appointment     Fasting labs have been ordered

## 2017-12-19 NOTE — TELEPHONE ENCOUNTER
Patient scheduled a CPE with Dr. Balwinder Fitzpatrick for 1/30/18 with Dr. Balwinder Fitzpatrick. He would like to get his fasting labs done prior. Can you order the labs?  Let patient know when labs are ordered so that we can schedule lab only appointment

## 2017-12-19 NOTE — LETTER
1/30/2018 4:16 PM 
 
Mr. Katherine Orta 
[address] [address] Dear Katherine Orta: 
 
Please find your most recent results below. Resulted Orders METABOLIC PANEL, BASIC Result Value Ref Range Glucose 96 65 - 99 mg/dL BUN 23 6 - 24 mg/dL Creatinine 1.15 0.76 - 1.27 mg/dL GFR est non-AA 70 >59 mL/min/1.73 GFR est AA 81 >59 mL/min/1.73  
 BUN/Creatinine ratio 20 9 - 20 Sodium 140 134 - 144 mmol/L Potassium 5.2 3.5 - 5.2 mmol/L Chloride 104 96 - 106 mmol/L  
 CO2 20 18 - 29 mmol/L Calcium 8.9 8.7 - 10.2 mg/dL Narrative Performed at:  [address]  [id number] : Beth Alonzo MD, Phone:  [phone] HEMOGLOBIN A1C WITH EAG Result Value Ref Range Hemoglobin A1c 5.3 4.8 - 5.6 % Comment:  
            Pre-diabetes: 5.7 - 6.4 Diabetes: >6.4 Glycemic control for adults with diabetes: <7.0 Estimated average glucose 105 mg/dL Narrative Performed at:  [address]  [id number] : Beth Alonzo MD, Phone:  [phone] LIPID PANEL Result Value Ref Range Cholesterol, total 155 100 - 199 mg/dL Triglyceride 47 0 - 149 mg/dL HDL Cholesterol 42 >39 mg/dL VLDL, calculated 9 5 - 40 mg/dL LDL, calculated 104 (H) 0 - 99 mg/dL Narrative Performed at:  [address]  [id number] : Beth Alonzo MD, Phone:  [phone] ALT Result Value Ref Range ALT (SGPT) 12 0 - 44 IU/L Narrative Performed at:  [address]  [id number] : Beth Alonzo MD, Phone:  [phone] CVD REPORT Result Value Ref Range INTERPRETATION Note Comment:  
   Supplemental report is available. Narrative Performed at:  [facility] [address] [address] [place], [place]  [id number] : Betsy Delong PhD, Phone:  [phone] RECOMMENDATIONS: 
 
Your blood tests look OK. Focus on regular exercise (150 minutes each week) and healthy eating.  Eat more fruits and vegetables.  Eat more protein (egg whites, beans, and nuts you know you tolerate) and less carbohydrates (white bread, white rice, white pasta, white potatoes, sodas, and sweets).  Eat appropriately small portion sizes. Sincerely, Ashley Mcmillan MD

## 2017-12-22 ENCOUNTER — TELEPHONE (OUTPATIENT)
Dept: SLEEP MEDICINE | Age: 57
End: 2017-12-22

## 2017-12-27 NOTE — TELEPHONE ENCOUNTER
Minimum recording time observed on initial HSAT study. Detailed voicemail left for patient to pickup repeat study from [facility].

## 2018-01-03 ENCOUNTER — TELEPHONE (OUTPATIENT)
Dept: SLEEP MEDICINE | Age: 58
End: 2018-01-03

## 2018-01-03 DIAGNOSIS — G47.33 OSA (OBSTRUCTIVE SLEEP APNEA): Primary | ICD-10-CM

## 2018-01-03 NOTE — TELEPHONE ENCOUNTER
Patient returned technologist's call re: repeat HSAT due to failure of initial test.  Patient advised that he can  device on Thursday, 1/4/2018 after 2pm.

## 2018-01-17 ENCOUNTER — HOSPITAL ENCOUNTER (OUTPATIENT)
Dept: SLEEP MEDICINE | Age: 58
Discharge: HOME | End: 2018-01-17
Payer: COMMERCIAL

## 2018-01-17 PROCEDURE — 95806 SLEEP STUDY UNATT&RESP EFFT: CPT

## 2018-01-18 ENCOUNTER — DOCUMENTATION ONLY (OUTPATIENT)
Dept: SLEEP MEDICINE | Age: 58
End: 2018-01-18

## 2018-01-24 NOTE — TELEPHONE ENCOUNTER
Patient is to be contacted by lead sleep technologist regarding results of Sleep Testing which was indicative of an average AHI of 4.1 per hour with an SpO2 marsha of 83% and SpO2 of < 88% being 1.0 minutes (total monitoring time 206 minutes). Patient should return for repeat attended testing due to 2 inconclusive home sleep apnea tests and presence of significant risk factors for Obstructive Sleep Apnea - STOP- BANG 5. Encounter Diagnosis   Name Primary?     BON (obstructive sleep apnea) Yes     Orders Placed This Encounter    POLYSOMNOGRAPHY 1 NIGHT     Standing Status:   Future     Standing Expiration Date:   7/25/2018     Order Specific Question:   Reason for Exam     Answer:   BON

## 2018-01-25 NOTE — TELEPHONE ENCOUNTER
Reviewed sleep study results with patient. He expressed understanding and is willing to proceed with a PSG.

## 2018-01-27 ENCOUNTER — OFFICE VISIT (OUTPATIENT)
Dept: FAMILY MEDICINE CLINIC | Age: 58
End: 2018-01-27

## 2018-01-27 VITALS
HEART RATE: 62 BPM | RESPIRATION RATE: 18 BRPM | OXYGEN SATURATION: 98 % | BODY MASS INDEX: 29.78 KG/M2 | HEIGHT: 70 IN | WEIGHT: 208 LBS | DIASTOLIC BLOOD PRESSURE: 97 MMHG | TEMPERATURE: 97.2 F | SYSTOLIC BLOOD PRESSURE: 147 MMHG

## 2018-01-27 DIAGNOSIS — M10.9 GOUT, UNSPECIFIED CAUSE, UNSPECIFIED CHRONICITY, UNSPECIFIED SITE: ICD-10-CM

## 2018-01-27 DIAGNOSIS — E78.9 LIPID DISORDER: ICD-10-CM

## 2018-01-27 DIAGNOSIS — R10.9 FLANK PAIN: Primary | ICD-10-CM

## 2018-01-27 DIAGNOSIS — R31.9 HEMATURIA, UNSPECIFIED TYPE: ICD-10-CM

## 2018-01-27 DIAGNOSIS — R31.9 HEMATURIA, UNSPECIFIED TYPE: Primary | ICD-10-CM

## 2018-01-27 LAB
ALT SERPL-CCNC: 12 IU/L (ref 0–44)
BILIRUB UR QL STRIP: NEGATIVE
BUN SERPL-MCNC: 23 MG/DL (ref 6–24)
BUN/CREAT SERPL: 20 (ref 9–20)
CALCIUM SERPL-MCNC: 8.9 MG/DL (ref 8.7–10.2)
CHLORIDE SERPL-SCNC: 104 MMOL/L (ref 96–106)
CHOLEST SERPL-MCNC: 155 MG/DL (ref 100–199)
CO2 SERPL-SCNC: 20 MMOL/L (ref 18–29)
CREAT SERPL-MCNC: 1.15 MG/DL (ref 0.76–1.27)
EST. AVERAGE GLUCOSE BLD GHB EST-MCNC: 105 MG/DL
GFR SERPLBLD CREATININE-BSD FMLA CKD-EPI: 70 ML/MIN/1.73
GFR SERPLBLD CREATININE-BSD FMLA CKD-EPI: 81 ML/MIN/1.73
GLUCOSE SERPL-MCNC: 96 MG/DL (ref 65–99)
GLUCOSE UR-MCNC: NEGATIVE MG/DL
HBA1C MFR BLD: 5.3 % (ref 4.8–5.6)
HDLC SERPL-MCNC: 42 MG/DL
INTERPRETATION, 910389: NORMAL
KETONES P FAST UR STRIP-MCNC: NEGATIVE MG/DL
LDLC SERPL CALC-MCNC: 104 MG/DL (ref 0–99)
PH UR STRIP: 5.5 [PH] (ref 4.6–8)
POTASSIUM SERPL-SCNC: 5.2 MMOL/L (ref 3.5–5.2)
PROT UR QL STRIP: NORMAL
SODIUM SERPL-SCNC: 140 MMOL/L (ref 134–144)
SP GR UR STRIP: 1.02 (ref 1–1.03)
TRIGL SERPL-MCNC: 47 MG/DL (ref 0–149)
UA UROBILINOGEN AMB POC: NORMAL (ref 0.2–1)
URINALYSIS CLARITY POC: CLEAR
URINALYSIS COLOR POC: YELLOW
URINE BLOOD POC: NORMAL
URINE LEUKOCYTES POC: NEGATIVE
URINE NITRITES POC: NEGATIVE
VLDLC SERPL CALC-MCNC: 9 MG/DL (ref 5–40)

## 2018-01-27 RX ORDER — ALLOPURINOL 300 MG/1
TABLET ORAL
Qty: 90 TAB | Refills: 0 | Status: SHIPPED | OUTPATIENT
Start: 2018-01-27 | End: 2018-08-03 | Stop reason: SDUPTHER

## 2018-01-27 RX ORDER — DOXYCYCLINE 100 MG/1
100 TABLET ORAL 2 TIMES DAILY
Qty: 20 TAB | Refills: 0 | Status: SHIPPED | OUTPATIENT
Start: 2018-01-27 | End: 2018-02-06

## 2018-01-27 RX ORDER — TAMSULOSIN HYDROCHLORIDE 0.4 MG/1
0.4 CAPSULE ORAL DAILY
Qty: 30 CAP | Refills: 1 | Status: SHIPPED | OUTPATIENT
Start: 2018-01-27 | End: 2019-04-18

## 2018-01-27 RX ORDER — TRAMADOL HYDROCHLORIDE 50 MG/1
50 TABLET ORAL
Qty: 45 TAB | Refills: 1 | Status: SHIPPED | OUTPATIENT
Start: 2018-01-27 | End: 2019-04-18 | Stop reason: ALTCHOICE

## 2018-01-27 RX ORDER — PRAVASTATIN SODIUM 40 MG/1
TABLET ORAL
Qty: 90 TAB | Refills: 0 | Status: SHIPPED | OUTPATIENT
Start: 2018-01-27 | End: 2018-01-30 | Stop reason: SDUPTHER

## 2018-01-27 NOTE — PROGRESS NOTES
1. Have you been to the ER, urgent care clinic since your last visit? Hospitalized since your last visit? No    2. Have you seen or consulted any other health care providers outside of the [address] [place] [address] since your last visit? Include any pap smears or colon screening.  No  Reviewed record in preparation for visit and have necessary documentation  Pt did not bring medication to office visit for review  opportunity was given for questions  Goals that were addressed and/or need to be completed during or after this appointment include    Health Maintenance Due   Topic Date Due    FOBT Q 1 YEAR AGE 50-75  01/25/2018

## 2018-01-27 NOTE — PROGRESS NOTES
Your blood tests look OK. Focus on regular exercise (150 minutes each week) and healthy eating. Eat more fruits and vegetables. Eat more protein (egg whites, beans, and nuts you know you tolerate) and less carbohydrates (white bread, white rice, white pasta, white potatoes, sodas, and sweets). Eat appropriately small portion sizes.

## 2018-01-27 NOTE — LETTER
1/30/2018 4:17 PM 
 
Mr. Sofia Lugo 
[address] [address] Dear Sofia Lugo: 
 
Please find your most recent results below. Resulted Orders AMB POC URINALYSIS DIP STICK AUTO W/O MICRO Result Value Ref Range Color (UA POC) Yellow Clarity (UA POC) Clear Glucose (UA POC) Negative Negative Bilirubin (UA POC) Negative Negative Ketones (UA POC) Negative Negative Specific gravity (UA POC) 1.020 1.001 - 1.035 Blood (UA POC) 2+ Negative pH (UA POC) 5.5 4.6 - 8.0 Protein (UA POC) 2+ Negative Urobilinogen (UA POC) 0.2 mg/dL 0.2 - 1 Nitrites (UA POC) Negative Negative Leukocyte esterase (UA POC) Negative Negative CULTURE, URINE Result Value Ref Range Urine Culture, Routine No growth Narrative Performed at:  [address]  [id number] : Alec Johnson MD, Phone:  [phone] RECOMMENDATIONS: 
 
Your urine culture is not helpful.  Obtain an ultrasound and see a urologist.  
 
Please call Sheyla Ocasio to help arrange and authorize any tests and/or referrals. [facility] # [phone] Central Scheduling at [facility] is #[phone] Sincerely, Aniceto Camacho MD

## 2018-01-27 NOTE — PATIENT INSTRUCTIONS
Take Doxycycline with food and a big glass of water.   Avoid the sun while on Doxycycline (either cover up or wear SPF 15 or above sun block)    Take tamsulosin daily (this may make your nose stuffy)    Use tramadol or tylenol for pain    If worse go to ER    Drink plenty of fluids

## 2018-01-27 NOTE — PROGRESS NOTES
Darek Salamanca is a 62 y.o. male      Issues discussed today include:        Signs and symptoms:  Flank pain into groin  Duration:  Few days  Context:  No previous h/o kidney stones  Location:    Quality:  No gross hematuria but 2+ on UA  Severity:  7/10  Timing:  Wax wane  Modifying factors:  Rx:  Flomax, doxy, tramadol    Data reviewed or ordered today:  KUB, consider US    Other problems include:  Patient Active Problem List   Diagnosis Code    Appendicitis K37    Hernia K46.9    FH: CAD (coronary artery disease) Z82.49    HTN (hypertension) I10    Gout M10.9    Cataract H26.9    History of normal resting EKG [id number]    S/P colonoscopy Z98.890    Microalbuminuria R80.9    Hyperglycemia R73.9    Rheumatoid factor positive R76.8    Lipid disorder E78.9       Medications:  Current Outpatient Prescriptions   Medication Sig Dispense Refill    doxycycline (ADOXA) 100 mg tablet Take 1 Tab by mouth two (2) times a day for 10 days. 20 Tab 0    tamsulosin (FLOMAX) 0.4 mg capsule Take 1 Cap by mouth daily. 30 Cap 1    traMADol (ULTRAM) 50 mg tablet Take 1 Tab by mouth every six (6) hours as needed for Pain. Max Daily Amount: 200 mg. 45 Tab 1    losartan (COZAAR) 100 mg tablet Take 1 Tab by mouth daily. 90 Tab 3    pravastatin (PRAVACHOL) 40 mg tablet Take 1 Tab by mouth nightly. 90 Tab 3    allopurinol (ZYLOPRIM) 300 mg tablet TAKE 1 TABLET BY MOUTH EVERY DAY 90 Tab 3    aspirin 81 mg chewable tablet Take 1 Tab by mouth daily. 90 Tab 3       Allergies:  No Known Allergies    LMP:  No LMP for male patient. Social History     Social History    Marital status:      Spouse name: N/A    Number of children: N/A    Years of education: N/A     Occupational History    Not on file.      Social History Main Topics    Smoking status: Never Smoker    Smokeless tobacco: Never Used    Alcohol use No    Drug use: No    Sexual activity: Not on file     Other Topics Concern    Not on file     Social History Narrative         Family History   Problem Relation Age of Onset    Stroke Father          Meaningful use:  done      ROS:  Headaches:  no  Chest Pain:  no  SOB:  no  Fevers:  no  Falls:  no    Other significant ROS:      No LMP for male patient. Physical Exam  Visit Vitals    BP (!) 147/97 (BP 1 Location: Right arm, BP Patient Position: Sitting)    Pulse 62    Temp 97.2 °F (36.2 °C) (Oral)    Resp 18    Ht 5' 10\" (1.778 m)    Wt 208 lb (94.3 kg)    SpO2 98%    BMI 29.84 kg/m2     BP Readings from Last 3 Encounters:   01/27/18 (!) 147/97   12/14/17 138/83   08/31/17 (!) 152/103     Constitutional:  Appears well,  No Acute Distress, Vitals noted  Psychiatric:   Affect normal, Alert and cooperative, Oriented to person/place/time    Eyes:   Pupils equally round and reactive, EOMI, conjunctiva clear, eyelids normal  ENT:   External ears and nose normal/lips, teeth=OK/gums normal, TMs and Orophyarynx normal  Neck:   general inspection and Thyroid normal.  No abnormal cervical or supraclavicular nodes    Lungs:   clear to auscultation, good respiratory effort  Heart: Ausculation normal.  Regular rhythm. No cardiac murmurs. No carotid bruits or palpable thrills  Chest wall normal, mild CVAT on right  Abd:  benign  Extremities:   without edema, good peripheral pulses  Skin:   Warm to palpation, without rashes, bruising, or suspicious lesions     :   Both testicles descended, no hernia, we discussed the USPSTF guidelines on prostate cancer screening and physical exam of the prostate was not done        Assessment:    Patient Active Problem List   Diagnosis Code    Appendicitis K37    Hernia K46.9    FH: CAD (coronary artery disease) Z82.49    HTN (hypertension) I10    Gout M10.9    Cataract H26.9    History of normal resting EKG [id number]    S/P colonoscopy Z98.890    Microalbuminuria R80.9    Hyperglycemia R73.9    Rheumatoid factor positive R76.8    Lipid disorder E78.9       Today's diagnoses are:    ICD-10-CM ICD-9-CM    1. Flank pain R10.9 789.09 AMB POC URINALYSIS DIP STICK AUTO W/O MICRO      CULTURE, URINE      doxycycline (ADOXA) 100 mg tablet      tamsulosin (FLOMAX) 0.4 mg capsule      traMADol (ULTRAM) 50 mg tablet      XR ABD (KUB)   2. Hematuria, unspecified type R31.9 599.70 doxycycline (ADOXA) 100 mg tablet      tamsulosin (FLOMAX) 0.4 mg capsule      traMADol (ULTRAM) 50 mg tablet      XR ABD (KUB)   3. Gout, unspecified cause, unspecified chronicity, unspecified site M10.9 274.9        Plan:  Orders Placed This Encounter    CULTURE, URINE    XR ABD (KUB)     Standing Status:   Future     Number of Occurrences:   1     Standing Expiration Date:   2/27/2019     Order Specific Question:   Reason for Exam     Answer:   flank pain and hematuria    AMB POC URINALYSIS DIP STICK AUTO W/O MICRO    doxycycline (ADOXA) 100 mg tablet     Sig: Take 1 Tab by mouth two (2) times a day for 10 days. Dispense:  20 Tab     Refill:  0     Take with food    tamsulosin (FLOMAX) 0.4 mg capsule     Sig: Take 1 Cap by mouth daily. Dispense:  30 Cap     Refill:  1    traMADol (ULTRAM) 50 mg tablet     Sig: Take 1 Tab by mouth every six (6) hours as needed for Pain. Max Daily Amount: 200 mg. Dispense:  45 Tab     Refill:  1       See patient instructions  Patient Instructions   Take Doxycycline with food and a big glass of water.   Avoid the sun while on Doxycycline (either cover up or wear SPF 15 or above sun block)    Take tamsulosin daily (this may make your nose stuffy)    Use tramadol or tylenol for pain    If worse go to ER    Drink plenty of fluids        refresh note:  done    AVS Printed:  done

## 2018-01-27 NOTE — MR AVS SNAPSHOT
[address] 
[phone] Patient: Adriana Park MRN: [id number] :1960 Visit Information Date & Time Provider Department Dept. Phone Encounter #  
 2018  8:55 AM Briana Reynaga MD [address] [place] [address] [phone] [id number] Your Appointments 2018  9:40 AM  
COMPLETE PHYSICAL with Briana Reynaga MD  
[facility]) Appt Note: CPE  
 [facility]0 [facility] 3 70 [facility]  
[phone]  
  
   
 [address] [place] [address] [phone] Upcoming Health Maintenance Date Due FOBT Q 1 YEAR AGE 50-75 2018 DTaP/Tdap/Td series (2 - Td) 2024 Allergies as of 2018  Review Complete On: 2018 By: Lisandro Bloom LPN No Known Allergies Current Immunizations  Reviewed on 2015 Name Date Tdap 2014 Not reviewed this visit You Were Diagnosed With   
  
 Codes Comments Flank pain    -  Primary ICD-10-CM: R10.9 ICD-9-CM: 789.09 Hematuria, unspecified type     ICD-10-CM: R31.9 ICD-9-CM: 599.70 Vitals BP Pulse Temp Resp Height(growth percentile) Weight(growth percentile) (!) 147/97 (BP 1 Location: Right arm, BP Patient Position: Sitting) 62 97.2 °F (36.2 °C) (Oral) 18 5' 10\" (1.778 m) 208 lb (94.3 kg) SpO2 BMI Smoking Status 98% 29.84 kg/m2 Never Smoker Vitals History BMI and BSA Data Body Mass Index Body Surface Area  
 29.84 kg/m 2 2.16 m 2 Preferred Pharmacy Pharmacy Name Phone [facility] [address]  [facility] [phone] Your Updated Medication List  
  
   
This list is accurate as of: 18 10:16 AM.  Always use your most recent med list.  
  
  
  
  
 allopurinol 300 mg tablet Commonly known as:  Lynyvette Rivera  
 TAKE 1 TABLET BY MOUTH EVERY DAY  
  
 aspirin 81 mg chewable tablet Take 1 Tab by mouth daily. doxycycline 100 mg tablet Commonly known as:  ADOXA Take 1 Tab by mouth two (2) times a day for 10 days. losartan 100 mg tablet Commonly known as:  COZAAR Take 1 Tab by mouth daily. pravastatin 40 mg tablet Commonly known as:  PRAVACHOL Take 1 Tab by mouth nightly. tamsulosin 0.4 mg capsule Commonly known as:  FLOMAX Take 1 Cap by mouth daily. traMADol 50 mg tablet Commonly known as:  ULTRAM  
Take 1 Tab by mouth every six (6) hours as needed for Pain. Max Daily Amount: 200 mg. Prescriptions Printed Refills  
 doxycycline (ADOXA) 100 mg tablet 0 Sig: Take 1 Tab by mouth two (2) times a day for 10 days. Class: Print Route: Oral  
 tamsulosin (FLOMAX) 0.4 mg capsule 1 Sig: Take 1 Cap by mouth daily. Class: Print Route: Oral  
 traMADol (ULTRAM) 50 mg tablet 1 Sig: Take 1 Tab by mouth every six (6) hours as needed for Pain. Max Daily Amount: 200 mg. Class: Print Route: Oral  
  
We Performed the Following AMB POC URINALYSIS DIP STICK AUTO W/O MICRO [91444 CPT(R)] CULTURE, URINE [id number] CPT(R)] To-Do List   
 01/27/2018 Imaging:  XR ABD (KUB) Patient Instructions Take Doxycycline with food and a big glass of water. Avoid the sun while on Doxycycline (either cover up or wear SPF 15 or above sun block) Take tamsulosin daily (this may make your nose stuffy) Use tramadol or tylenol for pain If worse go to ER [facility] & HEALTH SERVICES! Dear Dane Palm: 
Thank you for requesting a TravelSite.com account. Our records indicate that you already have an active [email or web address] account. You can access your account anytime at [email or web address] Did you know that you can access your hospital and ER discharge instructions at any time in TravelSite.com?   You can also review all of your test results from your hospital stay or ER visit. Additional Information If you have questions, please visit the Frequently Asked Questions section of the Spurfly website at [email or web address] Remember, Spurfly is NOT to be used for urgent needs. For medical emergencies, dial 911. Now available from your iPhone and Android! Please provide this summary of care documentation to your next provider. Your primary care clinician is listed as Eddie Riddle. If you have any questions after today's visit, please call [phone].

## 2018-01-29 DIAGNOSIS — Z12.11 SCREEN FOR COLON CANCER: Primary | ICD-10-CM

## 2018-01-29 LAB — BACTERIA UR CULT: NO GROWTH

## 2018-01-29 NOTE — PROGRESS NOTES
Your urine culture is not helpful. Obtain an ultrasound and see a urologist.    Please call Gabino Quintanilla to help arrange and authorize any tests and/or referrals.   Her # is [phone] at [facility] is #[phone]

## 2018-01-30 ENCOUNTER — OFFICE VISIT (OUTPATIENT)
Dept: FAMILY MEDICINE CLINIC | Age: 58
End: 2018-01-30

## 2018-01-30 ENCOUNTER — TELEPHONE (OUTPATIENT)
Dept: FAMILY MEDICINE CLINIC | Age: 58
End: 2018-01-30

## 2018-01-30 ENCOUNTER — HOSPITAL ENCOUNTER (OUTPATIENT)
Dept: CT IMAGING | Age: 58
Discharge: HOME | End: 2018-01-30
Payer: COMMERCIAL

## 2018-01-30 VITALS
WEIGHT: 210 LBS | SYSTOLIC BLOOD PRESSURE: 127 MMHG | RESPIRATION RATE: 16 BRPM | DIASTOLIC BLOOD PRESSURE: 87 MMHG | OXYGEN SATURATION: 99 % | HEART RATE: 60 BPM | TEMPERATURE: 98 F | BODY MASS INDEX: 30.06 KG/M2 | HEIGHT: 70 IN

## 2018-01-30 DIAGNOSIS — I10 ESSENTIAL HYPERTENSION: ICD-10-CM

## 2018-01-30 DIAGNOSIS — R73.9 HYPERGLYCEMIA: ICD-10-CM

## 2018-01-30 DIAGNOSIS — M10.9 GOUT, UNSPECIFIED CAUSE, UNSPECIFIED CHRONICITY, UNSPECIFIED SITE: ICD-10-CM

## 2018-01-30 DIAGNOSIS — R31.9 HEMATURIA, UNSPECIFIED TYPE: ICD-10-CM

## 2018-01-30 DIAGNOSIS — Z23 ENCOUNTER FOR IMMUNIZATION: ICD-10-CM

## 2018-01-30 DIAGNOSIS — Z00.00 HEALTH CARE MAINTENANCE: Primary | ICD-10-CM

## 2018-01-30 DIAGNOSIS — R80.9 MICROALBUMINURIA: ICD-10-CM

## 2018-01-30 DIAGNOSIS — Z13.31 SCREENING FOR DEPRESSION: ICD-10-CM

## 2018-01-30 DIAGNOSIS — E78.9 LIPID DISORDER: ICD-10-CM

## 2018-01-30 DIAGNOSIS — R10.9 FLANK PAIN: Primary | ICD-10-CM

## 2018-01-30 LAB — HEMOCCULT STL QL IA: NEGATIVE

## 2018-01-30 PROCEDURE — 74176 CT ABD & PELVIS W/O CONTRAST: CPT

## 2018-01-30 RX ORDER — ATORVASTATIN CALCIUM 40 MG/1
40 TABLET, FILM COATED ORAL DAILY
Qty: 90 TAB | Refills: 3 | Status: SHIPPED | OUTPATIENT
Start: 2018-01-30 | End: 2019-01-26 | Stop reason: SDUPTHER

## 2018-01-30 NOTE — PATIENT INSTRUCTIONS
obtain CT scan    Please call Nydia to help arrange and authorize any tests and/or referrals.   Her # is [phone] at [facility] Tawanda is #[phone]    See the urologist    Take tylenol or tramadol if needed for pain    Drink plenty of fluids    Collect any stone fragments for analysis    Consider PCV 13 vaccine (Rx given)

## 2018-01-30 NOTE — MR AVS SNAPSHOT
[address] 
[phone] Patient: Ruth Diallo MRN: [id number] :1960 Visit Information Date & Time Provider Department Dept. Phone Encounter #  
 2018  9:40 AM Nikolay Alegre MD [address] [place] [address] [phone] [id number] Upcoming Health Maintenance Date Due FOBT Q 1 YEAR AGE 50-75 2018 DTaP/Tdap/Td series (2 - Td) 2024 Allergies as of 2018  Review Complete On: 2018 By: Shoaib Ward LPN No Known Allergies Current Immunizations  Reviewed on 2015 Name Date Tdap 2014 Not reviewed this visit You Were Diagnosed With   
  
 Codes Comments Health care maintenance    -  Primary ICD-10-CM: Z00.00 ICD-9-CM: V70.0 Encounter for immunization     ICD-10-CM: D56 ICD-9-CM: V03.89 Rx for PCV 13 given Hematuria, unspecified type     ICD-10-CM: R31.9 ICD-9-CM: 599.70 Essential hypertension     ICD-10-CM: I10 
ICD-9-CM: 401.9 controlled Gout, unspecified cause, unspecified chronicity, unspecified site     ICD-10-CM: M10.9 ICD-9-CM: 274.9 ? UA stone Hyperglycemia     ICD-10-CM: R73.9 ICD-9-CM: 790.29 2018 a1c 5.3/  /MAB + Lipid disorder     ICD-10-CM: E78.9 ICD-9-CM: 272.9 swithc pravastatin to lipitor 40 mg  
 Microalbuminuria     ICD-10-CM: R80.9 ICD-9-CM: 791.0 on ARB Vitals BP Pulse Temp Resp Height(growth percentile) Weight(growth percentile) 127/87 (BP 1 Location: Left arm, BP Patient Position: Sitting) 60 98 °F (36.7 °C) (Oral) 16 5' 10\" (1.778 m) 210 lb (95.3 kg) SpO2 BMI Smoking Status 99% 30.13 kg/m2 Never Smoker Vitals History BMI and BSA Data Body Mass Index Body Surface Area  
 30.13 kg/m 2 2.17 m 2 Preferred Pharmacy Pharmacy Name Phone  [address] [place], [place] [address] AT [address] [place] [address] [address] [phone] Your Updated Medication List  
  
   
This list is accurate as of: 1/30/18 10:34 AM.  Always use your most recent med list.  
  
  
  
  
 allopurinol 300 mg tablet Commonly known as:  ZYLOPRIM  
TAKE 1 TABLET BY MOUTH EVERY DAY  
  
 aspirin 81 mg chewable tablet Take 1 Tab by mouth daily. atorvastatin 40 mg tablet Commonly known as:  LIPITOR Take 1 Tab by mouth daily. doxycycline 100 mg tablet Commonly known as:  ADOXA Take 1 Tab by mouth two (2) times a day for 10 days. losartan 100 mg tablet Commonly known as:  COZAAR Take 1 Tab by mouth daily. tamsulosin 0.4 mg capsule Commonly known as:  FLOMAX Take 1 Cap by mouth daily. traMADol 50 mg tablet Commonly known as:  ULTRAM  
Take 1 Tab by mouth every six (6) hours as needed for Pain. Max Daily Amount: 200 mg. Prescriptions Sent to Pharmacy Refills  
 atorvastatin (LIPITOR) 40 mg tablet 3 Sig: Take 1 Tab by mouth daily. Class: Normal  
 Pharmacy: [facility] [address] [place] [address] [address] AT [facility] [address] (\[facility]\"" Ph #: [phone] Route: Oral  
  
To-Do List   
 01/30/2018 Imaging:  CT ABD PELV WO CONT Patient Instructions   
obtain CT scan Please call [facility] to help arrange and authorize any tests and/or referrals. Her # is [phone] Central Scheduling at [facility] is #[phone] See the urologist 
 
Take tylenol or tramadol if needed for pain Drink plenty of fluids Collect any stone fragments for analysis Consider PCV 13 vaccine (Rx given) Introducing [facility] & HEALTH SERVICES! Dear Sophie Molina: 
Thank you for requesting a ClassDojo account. Our records indicate that you already have an active ClassDojo account. You can access your account anytime at [email or web address] Did you know that you can access your hospital and ER discharge instructions at any time in Home Online Income Systems? You can also review all of your test results from your hospital stay or ER visit. Additional Information If you have questions, please visit the Frequently Asked Questions section of the Home Online Income Systems website at [email or web address]/[email or web address] [email or web address]/. Remember, Home Online Income Systems is NOT to be used for urgent needs. For medical emergencies, dial 911. Now available from your iPhone and Android! Please provide this summary of care documentation to your next provider. Your primary care clinician is listed as Elio Anders. If you have any questions after today's visit, please call [phone].

## 2018-01-30 NOTE — PROGRESS NOTES
No stones are noted. I still think you need to see the urologist due to the blood in your urine. At this point I also suggest that you see a GI specialists Dr. Edwards Erm #[phone]. Please call Mook Gonzales to help arrange and authorize any tests and/or referrals.   Her # is [phone] at [facility] is #[phone]

## 2018-01-30 NOTE — LETTER
1/31/2018 8:33 AM 
 
Mr. Corin Mckinney 
[address] [address] Dear Corin Mckinney: 
 
Please find your most recent results below. Resulted Orders CT ABD PELV WO CONT Narrative EXAM:  CT ABD PELV WO CONT INDICATION: Bilateral flank pain x3-4 days with hematuria and question of renal 
stones. COMPARISON: None. CONTRAST:  None. TECHNIQUE:  
Thin axial images were obtained through the abdomen and pelvis. Coronal and 
sagittal reconstructions were generated. Oral contrast was not administered. CT 
dose reduction was achieved through use of a standardized protocol tailored for 
this examination and automatic exposure control for dose modulation. The absence of intravenous contrast material reduces the sensitivity for 
evaluation of the solid parenchymal organs of the abdomen. FINDINGS:  
LUNG BASES: Clear. INCIDENTALLY IMAGED HEART AND MEDIASTINUM: Unremarkable. LIVER: No mass or biliary dilatation. GALLBLADDER: Unremarkable. SPLEEN: No mass. PANCREAS: No mass or ductal dilatation. ADRENALS: Unremarkable. KIDNEYS/URETERS: No mass, calculus, or hydronephrosis. STOMACH: Unremarkable. SMALL BOWEL: No dilatation or wall thickening. COLON: No dilatation or wall thickening. APPENDIX: Unremarkable. PERITONEUM: There is nicho masslike density within the small bowel mesentery 
which appears to have an increased volume, pseudocapsule, and small associated 
lymph nodes. There is no ascites or pneumoperitoneum. RETROPERITONEUM: No lymphadenopathy or aortic aneurysm. REPRODUCTIVE ORGANS: Seminal vesicles and prostate appear normal. 
URINARY BLADDER: Partially distended with no evident mass or calculus. BONES: Mild degenerative spine change. No acute fracture or aggressive lesion. ADDITIONAL COMMENTS: Small fat-containing umbilical hernia without inflammatory 
change and small fat-containing left inguinal hernia are noted. Impression IMPRESSION: Caty density, small lymph nodes, and apparent increase in volume of 
central small bowel mesentery is suggestive of mesenteric panniculitis though 
other inflammatory and etiologies are not excluded. Follow-up recommended. No 
ureteral stones or other renal abnormality. Additional incidental findings as 
above. RECOMMENDATIONS: 
 
No stones are noted.  I still think you need to see the urologist due to the blood in your urine.  At this point I also suggest that you see a GI specialists Dr. Grace Soto #[phone]. Please call Natalia Pimentel to help arrange and authorize any tests and/or referrals. KALI LARA [facility] # is [phone] Central Scheduling at [facility] Form is #[phone] Sincerely, Jenniffer Caldera MD

## 2018-01-30 NOTE — TELEPHONE ENCOUNTER
Patient has appointment with   [facility]    Address: Chelo Garibay [address] [place] [id number]  Hours: Open · Closes 5PM  Phone: [phone]   at 1:50 with Dr. Christy Mccauley.

## 2018-01-30 NOTE — PROGRESS NOTES
Rosamaria Felix is a 62 y.o. male      Issues discussed today include:    See previous notes    Still having flank pain. He did not obtain US. Will obtain CT scan    Data reviewed or ordered today:  Lab from 1/26/2018    [facility] 2018    PSA:  1.3 2017  AAA screen:   Never smoker  Screening chest CT scan:   never smoker    Hearing screen:   done  Vision screening:   done  Depression screening:   done  Fall assessment:   done      We discussed health maintenance    BMI = Body mass index is 30.13 kg/(m^2). letter    We discussed diet/exercise/healthy weight    We reviewed and updated pertinent past medical history in the problem list      Colonoscopy:  2011  FOBT:  2018  TDAP:  2014  Pneumovax:  Consider 2019  PCV-13:  Rx given 2018  Flu shot:  Declined 2018  Zostavax:  Consider age 61  Eye exam:  Consider 2018  EKG: On file 2014    FTF for DME:  done:  none  Advanced directive:  Full code  Specialists:  KRIS Burger    In general, I advise patients to be as active as possible. I believe exercise is the key to long life and good health. The current recommendation is for individuals to exercise for 150 minutes each week (in other words 30 minutes 5 days a week). Exercise should be vigorous enough to work up a sweat. These activities include brisk walking, running, tennis, swimming, weight-lifting, etc.     I usually tell folks that work is work and exercise is exercise. Each of these activities has a different goal.  Even though you may be active at work, it may not be aerobically adequate. So build dedicated exercise time into your weekly routine. If any patient drinks alcohol, I suggest that a male drink only 2 beers (or glasses of wine, or shots of liquor) in any 24 hour period ( and not daily). For females, the limits are one drink per 24 hours (and not daily).   After these limits, the toxic effects of alcohol consumption start to manifest.     Avoid tobacco products. I may suggest specific smoking cessation instructions if needed. I typically suggest a wellness exam yearly during your birth month to update health maintenance issues such as fasting labs, EKGs and other studies, appropriate cancer screenings,  female exams as appropriate, immunizations, etc.    I suggest a yearly flu shot    Please call Aziza Mullen to help arrange and authorize any tests or referrals. Her # is [phone]         Other problems include:  Patient Active Problem List   Diagnosis Code    Appendicitis K37    Hernia K46.9    FH: CAD (coronary artery disease) Z82.49    HTN (hypertension) I10    Gout M10.9    Cataract H26.9    History of normal resting EKG [id number]    S/P colonoscopy Z98.890    Microalbuminuria R80.9    Hyperglycemia R73.9    Rheumatoid factor positive R76.8    Lipid disorder E78.9       Medications:  Current Outpatient Prescriptions   Medication Sig Dispense Refill    atorvastatin (LIPITOR) 40 mg tablet Take 1 Tab by mouth daily. 90 Tab 3    doxycycline (ADOXA) 100 mg tablet Take 1 Tab by mouth two (2) times a day for 10 days. 20 Tab 0    tamsulosin (FLOMAX) 0.4 mg capsule Take 1 Cap by mouth daily. 30 Cap 1    traMADol (ULTRAM) 50 mg tablet Take 1 Tab by mouth every six (6) hours as needed for Pain. Max Daily Amount: 200 mg. 45 Tab 1    allopurinol (ZYLOPRIM) 300 mg tablet TAKE 1 TABLET BY MOUTH EVERY DAY 90 Tab 0    losartan (COZAAR) 100 mg tablet Take 1 Tab by mouth daily. 90 Tab 3    aspirin 81 mg chewable tablet Take 1 Tab by mouth daily. 90 Tab 3       Allergies:  No Known Allergies    LMP:  No LMP for male patient. Social History     Social History    Marital status:      Spouse name: N/A    Number of children: N/A    Years of education: N/A     Occupational History    Not on file.      Social History Main Topics    Smoking status: Never Smoker    Smokeless tobacco: Never Used    Alcohol use No    Drug use: No    Sexual activity: Not on file     Other Topics Concern    Not on file     Social History Narrative         Family History   Problem Relation Age of Onset    Stroke Father          Meaningful use:  done      ROS:  Headaches:  no  Chest Pain:  no  SOB:  no  Fevers:  no  Falls:  no  anxiety/depression/losing interest in doing things that were previously enjoyed:  no. PHQ2 = 0  Other significant ROS:  Flank pain and hematuria. He has lost weight with diet/exercise recently  Patient denied problems with:    Hearing/vision, speaking/swallowing, Reflux/indigestion, Cough,Diarrhea/constipation,Problems passing or controlling urine,  Sexual function, Mood (anxiety/depression/losing interest in doing things that were previously enjoyed), Snoring/sleep apnea,Fatigue, Weight change, memory                                                             Falls in the past 12 months:  no           Over the last year (or since your last visit):  Have you been diagnosed with heart attack, stroke, broken bones, or skin cancer = no    Exercise:  Trying to do more             Smoking history:  none                                    No LMP for male patient.     Physical Exam  Visit Vitals    /87 (BP 1 Location: Left arm, BP Patient Position: Sitting)    Pulse 60    Temp 98 °F (36.7 °C) (Oral)    Resp 16    Ht 5' 10\" (1.778 m)    Wt 210 lb (95.3 kg)    SpO2 99%    BMI 30.13 kg/m2     BP Readings from Last 3 Encounters:   01/30/18 127/87   01/27/18 (!) 147/97   12/14/17 138/83     Constitutional:  Appears well,  No Acute Distress, Vitals noted  Psychiatric:   Affect normal, Alert and cooperative, Oriented to person/place/time    Eyes:   Pupils equally round and reactive, EOMI, conjunctiva clear, eyelids normal  ENT:   External ears and nose normal/lips, teeth=OK/gums normal, TMs and Orophyarynx normal  Neck:   general inspection and Thyroid normal.  No abnormal cervical or supraclavicular nodes    Lungs:   clear to auscultation, good respiratory effort  Heart: Ausculation normal.  Regular rhythm. No cardiac murmurs. No carotid bruits or palpable thrills  Chest wall normal, CVAT is improved but still some on left  Abdominal exam:   normal.  Liver and spleen normal.  No bruits/masses/ mild LLQ tenderness but no rebound  : Both testicles descended, no hernia,   we discussed the USPSTF guidelines on prostate cancer screening and physical exam of the prostate was not done        Extremities:   without edema, good peripheral pulses  Skin:   Warm to palpation, without rashes, bruising, or suspicious lesions     MSK:  Full RoM all joints      Assessment:    Patient Active Problem List   Diagnosis Code    Appendicitis K37    Hernia K46.9    FH: CAD (coronary artery disease) Z82.49    HTN (hypertension) I10    Gout M10.9    Cataract H26.9    History of normal resting EKG [id number]    S/P colonoscopy Z98.890    Microalbuminuria R80.9    Hyperglycemia R73.9    Rheumatoid factor positive R76.8    Lipid disorder E78.9       Today's diagnoses are:    ICD-10-CM ICD-9-CM    1. Health care maintenance Z00.00 V70.0    2. Encounter for immunization Z23 V03.89     Rx for PCV 13 given   3. Hematuria, unspecified type R31.9 599.70 CT ABD PELV WO CONT   4. Essential hypertension I10 401.9     controlled   5. Gout, unspecified cause, unspecified chronicity, unspecified site M10.9 274.9     ? UA stone   6. Hyperglycemia R73.9 790.29     1/2018 a1c 5.3/  /MAB +   7. Lipid disorder E78.9 272.9     swithc pravastatin to lipitor 40 mg   8. Microalbuminuria R80.9 791.0     on ARB   9. Screening for depression Z13.89 V79.0 [place] DEPRESSION SCREEN ANNUAL       Plan:  Orders Placed This Encounter    CT ABD PELV WO CONT     Standing Status:   Future     Standing Expiration Date:   1/31/2019     Order Specific Question:   Is Patient Allergic to Contrast Dye?      Answer:   Unknown     Order Specific Question:   Type of contrast.  PLEASE NOTE: IV contrast is NOT utilized with this order. Answer:   None    ID DEPRESSION SCREEN ANNUAL    atorvastatin (LIPITOR) 40 mg tablet     Sig: Take 1 Tab by mouth daily. Dispense:  90 Tab     Refill:  3       See patient instructions  Patient Instructions   obtain CT scan    Please call Nydia to help arrange and authorize any tests and/or referrals. Her # is [phone] at [employer] is #[phone]    See the urologist    Take tylenol or tramadol if needed for pain    Drink plenty of fluids    Collect any stone fragments for analysis    Consider PCV 13 vaccine (Rx given)        refresh note:  done    AVS Printed:  done    Diagnoses and all orders for this visit:    1. Health care maintenance    2. Encounter for immunization  Comments:  Rx for PCV 13 given    3. Hematuria, unspecified type  -     CT ABD PELV WO CONT; Future    4. Essential hypertension  Comments:  controlled    5. Gout, unspecified cause, unspecified chronicity, unspecified site  Comments:  ? UA stone    6. Hyperglycemia  Comments:  1/2018 a1c 5.3/  /MAB +    7. Lipid disorder  Comments:  swithc pravastatin to lipitor 40 mg    8. Microalbuminuria  Comments:  on ARB    9. Screening for depression  -     [id number] [place] [facility]    Other orders  -     atorvastatin (LIPITOR) 40 mg tablet; Take 1 Tab by mouth daily.

## 2018-01-30 NOTE — PROGRESS NOTES
1. Have you been to the ER, urgent care clinic since your last visit? Hospitalized since your last visit? No    2. Have you seen or consulted any other health care providers outside of the [address] [place] [address] since your last visit? Include any pap smears or colon screening. No  Chief Complaint   Patient presents with    Complete Physical     Patient declines influenza vaccines.

## 2018-02-07 ENCOUNTER — DOCUMENTATION ONLY (OUTPATIENT)
Dept: SLEEP MEDICINE | Age: 58
End: 2018-02-07

## 2018-02-07 NOTE — PROGRESS NOTES
Records request from [facility] mailed to the address provided. Document scanned in chart. Fax number was not available.

## 2018-09-10 DIAGNOSIS — I10 ESSENTIAL HYPERTENSION: ICD-10-CM

## 2018-09-10 RX ORDER — LOSARTAN POTASSIUM 100 MG/1
TABLET ORAL
Qty: 90 TAB | Refills: 0 | Status: SHIPPED | OUTPATIENT
Start: 2018-09-10 | End: 2019-01-01 | Stop reason: SDUPTHER

## 2018-10-29 DIAGNOSIS — M10.9 GOUT, UNSPECIFIED CAUSE, UNSPECIFIED CHRONICITY, UNSPECIFIED SITE: ICD-10-CM

## 2018-10-29 RX ORDER — ALLOPURINOL 300 MG/1
TABLET ORAL
Qty: 90 TAB | Refills: 0 | Status: SHIPPED | OUTPATIENT
Start: 2018-10-29 | End: 2019-04-18 | Stop reason: SDUPTHER

## 2019-01-28 RX ORDER — ATORVASTATIN CALCIUM 40 MG/1
40 TABLET, FILM COATED ORAL DAILY
Qty: 90 TAB | Refills: 3 | Status: SHIPPED | OUTPATIENT
Start: 2019-01-28 | End: 2019-04-18 | Stop reason: SDUPTHER

## 2019-03-31 DIAGNOSIS — I10 ESSENTIAL HYPERTENSION: ICD-10-CM

## 2019-04-01 RX ORDER — LOSARTAN POTASSIUM 100 MG/1
TABLET ORAL
Qty: 30 TAB | Refills: 0 | Status: SHIPPED | OUTPATIENT
Start: 2019-04-01 | End: 2019-04-18 | Stop reason: SDUPTHER

## 2019-04-01 NOTE — TELEPHONE ENCOUNTER
LOV 1/2018. Needs appointment for additional refills. Sent 30 day supply. No additional refills until follow up.

## 2019-04-18 ENCOUNTER — HOSPITAL ENCOUNTER (OUTPATIENT)
Dept: LAB | Age: 59
Discharge: HOME | End: 2019-04-18

## 2019-04-18 ENCOUNTER — OFFICE VISIT (OUTPATIENT)
Dept: FAMILY MEDICINE CLINIC | Age: 59
End: 2019-04-18

## 2019-04-18 VITALS
OXYGEN SATURATION: 96 % | HEART RATE: 68 BPM | BODY MASS INDEX: 31.15 KG/M2 | RESPIRATION RATE: 16 BRPM | WEIGHT: 217.6 LBS | TEMPERATURE: 97.9 F | HEIGHT: 70 IN | DIASTOLIC BLOOD PRESSURE: 87 MMHG | SYSTOLIC BLOOD PRESSURE: 142 MMHG

## 2019-04-18 DIAGNOSIS — Z00.01 ENCOUNTER FOR WELL ADULT EXAM WITH ABNORMAL FINDINGS: Primary | ICD-10-CM

## 2019-04-18 DIAGNOSIS — M10.9 GOUT, UNSPECIFIED CAUSE, UNSPECIFIED CHRONICITY, UNSPECIFIED SITE: ICD-10-CM

## 2019-04-18 DIAGNOSIS — E78.5 DYSLIPIDEMIA: ICD-10-CM

## 2019-04-18 DIAGNOSIS — I10 ESSENTIAL HYPERTENSION: ICD-10-CM

## 2019-04-18 DIAGNOSIS — Z23 ENCOUNTER FOR IMMUNIZATION: ICD-10-CM

## 2019-04-18 DIAGNOSIS — R73.02 IMPAIRED GLUCOSE TOLERANCE: ICD-10-CM

## 2019-04-18 DIAGNOSIS — E66.09 CLASS 1 OBESITY DUE TO EXCESS CALORIES WITHOUT SERIOUS COMORBIDITY WITH BODY MASS INDEX (BMI) OF 31.0 TO 31.9 IN ADULT: ICD-10-CM

## 2019-04-18 DIAGNOSIS — D22.9 ATYPICAL NEVUS: ICD-10-CM

## 2019-04-18 DIAGNOSIS — K40.90 RIGHT INGUINAL HERNIA: ICD-10-CM

## 2019-04-18 LAB
ALBUMIN UR QL STRIP: 80 MG/L
CREATININE, URINE POC: 100 MG/DL
MICROALBUMIN/CREAT RATIO POC: NORMAL MG/G

## 2019-04-18 RX ORDER — LIDOCAINE HYDROCHLORIDE AND EPINEPHRINE 10; 10 MG/ML; UG/ML
2 INJECTION, SOLUTION INFILTRATION; PERINEURAL ONCE
Qty: 1 VIAL | Refills: 0
Start: 2019-04-18 | End: 2019-04-18

## 2019-04-18 RX ORDER — LOSARTAN POTASSIUM 100 MG/1
TABLET ORAL
Qty: 90 TAB | Refills: 2 | Status: SHIPPED | OUTPATIENT
Start: 2019-04-18 | End: 2020-03-09

## 2019-04-18 RX ORDER — ATORVASTATIN CALCIUM 40 MG/1
40 TABLET, FILM COATED ORAL DAILY
Qty: 90 TAB | Refills: 2 | Status: SHIPPED | OUTPATIENT
Start: 2019-04-18 | End: 2020-03-09

## 2019-04-18 RX ORDER — ALLOPURINOL 300 MG/1
300 TABLET ORAL DAILY
Qty: 90 TAB | Refills: 2 | Status: SHIPPED | OUTPATIENT
Start: 2019-04-18 | End: 2020-03-09

## 2019-04-18 NOTE — PROGRESS NOTES
[facility]  OFFICE PROCEDURE PROGRESS NOTE        Chart reviewed for the following:   Gabrielle Sadler MD, have reviewed the History, Physical and updated the Allergic reactions for Ashlyn Moore performed immediately prior to start of procedure:   Gabrielle Sadler MD, have performed the following reviews on [address] [place] [address] prior to the start of the procedure:            * Patient was identified by name and date of birth   * Agreement on procedure being performed was verified  * Risks and Benefits explained to the patient  * Procedure site verified and marked as necessary  * Patient was positioned for comfort  * Consent was signed and verified     Time: 9:08am      Date of procedure: 4/18/2019    Procedure performed by: Kaley Olivares MD    Provider assisted by: Bebe Lopez LPN    Patient assisted by: self    How tolerated by patient: tolerated the procedure well with no complications    Post Procedural Pain Scale: 0 - No Hurt    Comments:     PROCEDURE NOTE:     Informed consent obtained verbally and risks, benefits and alternatives discussed. Time out performed, cross checking patient ID and procedure. The skin lesion over the scalp was prepped with chloroprep x 3 and anesthetized with 1 ml of 1% lidocaine with 1% epinephrine. A 4mm punch biopsy was performed and clipped with surgical scissors. Hemostasis obtained with direct pressure. The skin lesion was placed in a pathology specimen container. The patient tolerated the procedure well and there were no complications.

## 2019-04-18 NOTE — PATIENT INSTRUCTIONS
High Blood Pressure: Care Instructions  Overview    It's normal for blood pressure to go up and down throughout the day. But if it stays up, you have high blood pressure. Another name for high blood pressure is hypertension. Despite what a lot of people think, high blood pressure usually doesn't cause headaches or make you feel dizzy or lightheaded. It usually has no symptoms. But it does increase your risk of stroke, heart attack, and other problems. You and your doctor will talk about your risks of these problems based on your blood pressure. Your doctor will give you a goal for your blood pressure. Your goal will be based on your health and your age. Lifestyle changes, such as eating healthy and being active, are always important to help lower blood pressure. You might also take medicine to reach your blood pressure goal.  Follow-up care is a key part of your treatment and safety. Be sure to make and go to all appointments, and call your doctor if you are having problems. It's also a good idea to know your test results and keep a list of the medicines you take. How can you care for yourself at home? Medical treatment  · If you stop taking your medicine, your blood pressure will go back up. You may take one or more types of medicine to lower your blood pressure. Be safe with medicines. Take your medicine exactly as prescribed. Call your doctor if you think you are having a problem with your medicine. · Talk to your doctor before you start taking aspirin every day. Aspirin can help certain people lower their risk of a heart attack or stroke. But taking aspirin isn't right for everyone, because it can cause serious bleeding. · See your doctor regularly. You may need to see the doctor more often at first or until your blood pressure comes down. · If you are taking blood pressure medicine, talk to your doctor before you take decongestants or anti-inflammatory medicine, such as ibuprofen.  Some of these medicines can raise blood pressure. · Learn how to check your blood pressure at home. Lifestyle changes  · Stay at a healthy weight. This is especially important if you put on weight around the waist. Losing even 10 pounds can help you lower your blood pressure. · If your doctor recommends it, get more exercise. Walking is a good choice. Bit by bit, increase the amount you walk every day. Try for at least 30 minutes on most days of the week. You also may want to swim, bike, or do other activities. · Avoid or limit alcohol. Talk to your doctor about whether you can drink any alcohol. · Try to limit how much sodium you eat to less than 2,300 milligrams (mg) a day. Your doctor may ask you to try to eat less than 1,500 mg a day. · Eat plenty of fruits (such as bananas and oranges), vegetables, legumes, whole grains, and low-fat dairy products. · Lower the amount of saturated fat in your diet. Saturated fat is found in animal products such as milk, cheese, and meat. Limiting these foods may help you lose weight and also lower your risk for heart disease. · Do not smoke. Smoking increases your risk for heart attack and stroke. If you need help quitting, talk to your doctor about stop-smoking programs and medicines. These can increase your chances of quitting for good. When should you call for help? Call 911 anytime you think you may need emergency care. This may mean having symptoms that suggest that your blood pressure is causing a serious heart or blood vessel problem. Your blood pressure may be over 180/120.   For example, call 911 if:    · You have symptoms of a heart attack. These may include:  ? Chest pain or pressure, or a strange feeling in the chest.  ? Sweating. ? Shortness of breath. ? Nausea or vomiting. ? Pain, pressure, or a strange feeling in the back, neck, jaw, or upper belly or in one or both shoulders or arms. ? Lightheadedness or sudden weakness.   ? A fast or irregular heartbeat.     · You have symptoms of a stroke. These may include:  ? Sudden numbness, tingling, weakness, or loss of movement in your face, arm, or leg, especially on only one side of your body. ? Sudden vision changes. ? Sudden trouble speaking. ? Sudden confusion or trouble understanding simple statements. ? Sudden problems with walking or balance. ? A sudden, severe headache that is different from past headaches.     · You have severe back or belly pain.    Do not wait until your blood pressure comes down on its own. Get help right away.   Call your doctor now or seek immediate care if:    · Your blood pressure is much higher than normal (such as 180/120 or higher), but you don't have symptoms.     · You think high blood pressure is causing symptoms, such as:  ? Severe headache.  ? Blurry vision.    Watch closely for changes in your health, and be sure to contact your doctor if:    · Your blood pressure measures higher than your doctor recommends at least 2 times. That means the top number is higher or the bottom number is higher, or both.     · You think you may be having side effects from your blood pressure medicine. Where can you learn more? Go to http://jess-danyel.info/. Enter Y903 in the search box to learn more about \"High Blood Pressure: Care Instructions. \"  Current as of: July 22, 2018  Content Version: 11.9  © 1331-6426 [employer]. Care instructions adapted under license by [employer] (which disclaims liability or warranty for this information). If you have questions about a medical condition or this instruction, always ask your healthcare professional. [place] [address] any warranty or liability for your use of this information.

## 2019-04-18 NOTE — PROGRESS NOTES
[address] [place] [address] with [facility] and [facility]       Subjective  Ilani Zaid Richey is an 62 y.o. male who presents for complete physical exam.    Doing well. Diet: Has been cutting out carbohydrates. Lost a lot of weight. Has been eating more fast food more frequently as he has been travelsing. Exercise: walks daily. Would also like to discuss HTN today. Questionaire: Hypertension Report Card      1) Do you follow a low salt diet? tries   2) What medications/doses are you on? Losartan - 100mg daily       3) Medication Tolerance/Side Effects: tolerates well, no side effects   4) Do you keep your Primary Care Follow Up Appts? Has not been seen since 1/2018.   5)  Last BMP:  Lab Results   Component Value Date/Time    Sodium 140 01/26/2018 09:12 AM    Potassium 5.2 01/26/2018 09:12 AM    Chloride 104 01/26/2018 09:12 AM    CO2 20 01/26/2018 09:12 AM    BUN 23 01/26/2018 09:12 AM    Creatinine 1.15 01/26/2018 09:12 AM    Glucose 96 01/26/2018 09:12 AM      6) Goal BP: <135/90   7) Do you take your medications daily? Usually does, has not taken them in 2 days. 8) Do you check your blood pressure? no   9) Have you gained weight? Has lost a decent amount of weight overall, but up 7 lbs since last year. 10) Do you follow an exercise program? walks    11) Last Urine Microalbumin:  Lab Results   Component Value Date/Time    ALBUMIN, URINE POC 80 04/18/2019 09:46 AM    CREATININE, URINE  04/18/2019 09:46 AM    Microalbumin/creat ratio (POC)  04/18/2019 09:46 AM      12) Personal Review of Last EKG Tracing during today's encounter (date/intrepretation): 4/2014, NSR, ventricular rate is 73.   13) Do you have a cardiologist? No   14) Do you have a nephrologist? No   15) ROS  · Chest Pain - no  · Shortness of Breath - no  · Visual Disturbance - no  · Headache - no   16) Can you do better? yes      Complaining of inguinal hernia today.   Has this repaired in 2007 in [place]. Thinks that the mesh may be failing. Having some discomfort with this. Does not remember the surgeon that he used. Wife is concerned about a mole on his scalp. He is asking to have this biopsied. It is changing color and growing in size. He has no personal history of skin cancer. Usually wears a hat when outdoors. Rarely wears sunscreen. Needs allopurinol refill for gout. States that his chronic disease is well controlled on this medication. Allergies - reviewed:   No Known Allergies      Medications - reviewed:  Current Outpatient Medications   Medication Sig    allopurinol (ZYLOPRIM) 300 mg tablet Take 1 Tab by mouth daily.  atorvastatin (LIPITOR) 40 mg tablet Take 1 Tab by mouth daily.  losartan (COZAAR) 100 mg tablet TAKE 1 TABLET BY MOUTH EVERY DAY    varicella-zoster recombinant, PF, (SHINGRIX, PF,) 50 mcg/0.5 mL susr injection Give 1 dose now, second dose 2-6 months following dose 1.    lidocaine-EPINEPHrine (XYLOCAINE) 1 %-1:100,000 injection 2 mL by IntraDERMal route once for 1 dose.  aspirin 81 mg chewable tablet Take 1 Tab by mouth daily. No current facility-administered medications for this visit.           Past Medical History - reviewed:  Past Medical History:   Diagnosis Date    Hypertension          Past Surgical History - reviewed:  Past Surgical History:   Procedure Laterality Date    HX APPENDECTOMY      HX HERNIA REPAIR      inguinal         Family History - reviewed:  Family History   Problem Relation Age of Onset    Stroke Father          Social History - reviewed:  Social History     Socioeconomic History    Marital status:      Spouse name: Not on file    Number of children: Not on file    Years of education: Not on file    Highest education level: Not on file   Occupational History    Not on file   Social Needs    Financial resource strain: Not on file    Food insecurity:     Worry: Not on file     Inability: Not on file   [address] [place] [address] Transportation needs:     Medical: Not on file     Non-medical: Not on file   Tobacco Use    Smoking status: Never Smoker    Smokeless tobacco: Never Used   Substance and Sexual Activity    Alcohol use: No    Drug use: No    Sexual activity: Not on file   Lifestyle    Physical activity:     Days per week: Not on file     Minutes per session: Not on file    Stress: Not on file   Relationships    Social connections:     Talks on phone: Not on file     Gets together: Not on file     Attends Quaker service: Not on file     Active member of club or organization: Not on file     Attends meetings of clubs or organizations: Not on file     Relationship status: Not on file    Intimate partner violence:     Fear of current or ex partner: Not on file     Emotionally abused: Not on file     Physically abused: Not on file     Forced sexual activity: Not on file   Other Topics Concern    Not on file   Social History Narrative    Not on file         Immunizations - reviewed:   Immunization History   Administered Date(s) Administered    Tdap 04/29/2014     Flu: not flu season  Tdap: 2014  Pneumovax: n/a  Shingrix: not yet received. Health Maintenance reviewed -  Colonoscopy normal in 2018, next in 10 year. DEXA scan n/a  HIV testing n/a  Hepatitis C testing negative in 2015  Lung cancer screening n/a      Review of Systems  Review of Systems   Constitutional: Negative for chills and fever. HENT: Negative for congestion. Respiratory: Negative for shortness of breath. Cardiovascular: Negative for chest pain. Gastrointestinal: Negative for abdominal pain, constipation, diarrhea, nausea and vomiting. Physical Exam    Visit Vitals  /87   Pulse 68   Temp 97.9 °F (36.6 °C) (Oral)   Resp 16   Ht 5' 10\" (1.778 m)   Wt 217 lb 9.6 oz (98.7 kg)   SpO2 96%   BMI 31.22 kg/m²     Physical Exam   Constitutional: He is oriented to person, place, and time. He appears well-nourished. No distress. Obese. HENT:   Head: Normocephalic. Right Ear: Hearing and external ear normal.   Left Ear: Hearing and external ear normal.   Mouth/Throat: Oropharynx is clear and moist. No oropharyngeal exudate. Eyes: Pupils are equal, round, and reactive to light. Conjunctivae and EOM are normal. Right eye exhibits no discharge. Left eye exhibits no discharge. Neck: Normal range of motion. Neck supple. No thyromegaly present. Cardiovascular: Normal rate, regular rhythm, normal heart sounds and intact distal pulses. Exam reveals no gallop and no friction rub. No murmur heard. Pulmonary/Chest: Effort normal and breath sounds normal. No respiratory distress. He has no wheezes. He has no rales. He exhibits no tenderness. Abdominal: Soft. Bowel sounds are normal. He exhibits mass (diastasis recti). He exhibits no distension. There is no tenderness. There is no rebound and no guarding. Musculoskeletal: Normal range of motion. He exhibits no edema. Lymphadenopathy:     He has no cervical adenopathy. Neurological: He is alert and oriented to person, place, and time. He has normal strength. No cranial nerve deficit. Skin: Skin is warm and dry. He is not diaphoretic. No erythema. 1cm nevus on scalp, varying colors and irregular borders. Psychiatric: He has a normal mood and affect. Nursing note and vitals reviewed. Recent Results (from the past 12 hour(s))   AMB POC URINE, MICROALBUMIN, SEMIQUANT (3 RESULTS)    Collection Time: 04/18/19  9:46 AM   Result Value Ref Range    ALBUMIN, URINE POC 80 Negative mg/L    CREATININE, URINE  mg/dL    Microalbumin/creat ratio (POC)  <30 MG/G         Assessment:       ICD-10-CM ICD-9-CM    1. Encounter for well adult exam with abnormal findings Z00.01 V70.0    2. Essential hypertension Y90 205.5 METABOLIC PANEL, COMPREHENSIVE      AMB POC URINE, MICROALBUMIN, SEMIQUANT (3 RESULTS)      CBC W/O DIFF      losartan (COZAAR) 100 mg tablet   3.  Dyslipidemia E78.5 272.4 LIPID PANEL      atorvastatin (LIPITOR) 40 mg tablet   4. Class 1 obesity due to excess calories without serious comorbidity with body mass index (BMI) of 31.0 to 31.9 in adult E66.09 278.00 HEMOGLOBIN A1C WITH EAG    Z68.31 V85.31    5. Impaired glucose tolerance R73.02 790.22 HEMOGLOBIN A1C WITH EAG   6. Gout, unspecified cause, unspecified chronicity, unspecified site M10.9 274.9 allopurinol (ZYLOPRIM) 300 mg tablet      URIC ACID   7. Right inguinal hernia K40.90 550.90 REFERRAL TO GENERAL SURGERY   8. Encounter for immunization Z23 V03.89 varicella-zoster recombinant, PF, (SHINGRIX, PF,) 50 mcg/0.5 mL susr injection   9. Atypical nevus D22.9 216.9 DESTRUC PREMALIGNANT, FIRST LESION      SURGICAL PATHOLOGY      lidocaine-EPINEPHrine (XYLOCAINE) 1 %-1:100,000 injection           Plan:     1. Essential hypertension - BP above goal, has been off medication. Reordered this today. Needs to check BP at home to ensure at goal.  Updating labs. - METABOLIC PANEL, COMPREHENSIVE  - AMB POC URINE, MICROALBUMIN, SEMIQUANT (3 RESULTS)  - CBC W/O DIFF  - losartan (COZAAR) 100 mg tablet; TAKE 1 TABLET BY MOUTH EVERY DAY  Dispense: 90 Tab; Refill: 2    2. Dyslipidemia - refilling lipitor. Checking lipids.  - LIPID PANEL  - atorvastatin (LIPITOR) 40 mg tablet; Take 1 Tab by mouth daily. Dispense: 90 Tab; Refill: 2    3. Class 1 obesity due to excess calories without serious comorbidity with body mass index (BMI) of 31.0 to 31.9 in adult - discuss diet and lifestyle modifications. Has lost a fair amount of weight since 2017. Up about 7 lbs from last year. - HEMOGLOBIN A1C WITH EAG    4. Impaired glucose tolerance- see #3.  - HEMOGLOBIN A1C WITH EAG    5. Gout, unspecified cause, unspecified chronicity, unspecified site- symptoms controlled on allpurinol. Will check uric acid today. - allopurinol (ZYLOPRIM) 300 mg tablet; Take 1 Tab by mouth daily. Dispense: 90 Tab; Refill: 2  - URIC ACID    6.  Right inguinal hernia - previously repaired with mesh. Will refer to local surgeon for further evaluation.  - REFERRAL TO GENERAL SURGERY    7. Encounter for immunization - shingrix script given today. - varicella-zoster recombinant, PF, (SHINGRIX, PF,) 50 mcg/0.5 mL susr injection; Give 1 dose now, second dose 2-6 months following dose 1. Dispense: 0.5 mL; Refill: 1    8. Atypical nevus - punch biopsy done today--see separate procedure note. - DESTRUC PREMALIGNANT, FIRST LESION  - SURGICAL PATHOLOGY  - lidocaine-EPINEPHrine (XYLOCAINE) 1 %-1:100,000 injection; 2 mL by IntraDERMal route once for 1 dose. Dispense: 1 Vial; Refill: 0    9. Encounter for well adult exam with abnormal findings    · Counseled re: diet, exercise, healthy lifestyle    · Appropriate labs, vaccines, imaging studies, and referrals ordered as listed above    ---  Follow-up and Dispositions    · Return in about 6 months (around 10/18/2019) for HTN follow up. ---    I have discussed the diagnosis with the patient and the intended plan as seen in the above orders. The patient has received an after-visit summary and questions were answered concerning future plans. I have discussed medication side effects and warnings with the patient as well. Informed pt to return to the office if new symptoms arise. Patient was discussed with Dr. Gavin Garcia.     Jhoana Méndez MD  Family Medicine Resident

## 2019-04-18 NOTE — PROGRESS NOTES
Chief Complaint   Patient presents with    Complete Physical     Blood pressure 142/87, pulse 68, temperature 97.9 °F (36.6 °C), temperature source Oral, resp. rate 16, height 5' 10\" (1.778 m), weight 217 lb 9.6 oz (98.7 kg), SpO2 96 %. 1. Have you been to the ER, urgent care clinic since your last visit? Hospitalized since your last visit? No    2. Have you seen or consulted any other health care providers outside of the [address] [place] [address] since your last visit? Include any pap smears or colon screening.  No

## 2019-04-19 LAB
ALBUMIN SERPL-MCNC: 4.1 G/DL (ref 3.5–5.5)
ALBUMIN/GLOB SERPL: 1.4 {RATIO} (ref 1.2–2.2)
ALP SERPL-CCNC: 79 IU/L (ref 39–117)
ALT SERPL-CCNC: 9 IU/L (ref 0–44)
AST SERPL-CCNC: 14 IU/L (ref 0–40)
BILIRUB SERPL-MCNC: 0.6 MG/DL (ref 0–1.2)
BUN SERPL-MCNC: 19 MG/DL (ref 6–24)
BUN/CREAT SERPL: 15 (ref 9–20)
CALCIUM SERPL-MCNC: 9.3 MG/DL (ref 8.7–10.2)
CHLORIDE SERPL-SCNC: 102 MMOL/L (ref 96–106)
CHOLEST SERPL-MCNC: 162 MG/DL (ref 100–199)
CO2 SERPL-SCNC: 24 MMOL/L (ref 20–29)
CREAT SERPL-MCNC: 1.25 MG/DL (ref 0.76–1.27)
ERYTHROCYTE [DISTWIDTH] IN BLOOD BY AUTOMATED COUNT: 15 % (ref 12.3–15.4)
EST. AVERAGE GLUCOSE BLD GHB EST-MCNC: 114 MG/DL
GLOBULIN SER CALC-MCNC: 2.9 G/DL (ref 1.5–4.5)
GLUCOSE SERPL-MCNC: 103 MG/DL (ref 65–99)
HBA1C MFR BLD: 5.6 % (ref 4.8–5.6)
HCT VFR BLD AUTO: 48.3 % (ref 37.5–51)
HDLC SERPL-MCNC: 45 MG/DL
HGB BLD-MCNC: 15.7 G/DL (ref 13–17.7)
INTERPRETATION, 910389: NORMAL
LDLC SERPL CALC-MCNC: 104 MG/DL (ref 0–99)
MCH RBC QN AUTO: 27 PG (ref 26.6–33)
MCHC RBC AUTO-ENTMCNC: 32.5 G/DL (ref 31.5–35.7)
MCV RBC AUTO: 83 FL (ref 79–97)
PLATELET # BLD AUTO: 213 X10E3/UL (ref 150–379)
POTASSIUM SERPL-SCNC: 5.5 MMOL/L (ref 3.5–5.2)
PROT SERPL-MCNC: 7 G/DL (ref 6–8.5)
RBC # BLD AUTO: 5.82 X10E6/UL (ref 4.14–5.8)
SODIUM SERPL-SCNC: 140 MMOL/L (ref 134–144)
TRIGL SERPL-MCNC: 67 MG/DL (ref 0–149)
URATE SERPL-MCNC: 7.4 MG/DL (ref 3.7–8.6)
VLDLC SERPL CALC-MCNC: 13 MG/DL (ref 5–40)
WBC # BLD AUTO: 6.1 X10E3/UL (ref 3.4–10.8)

## 2019-04-19 NOTE — PROGRESS NOTES
Results reviewed. CMP is OK. Potassium a little elevated. Will monitor. Cholesterol mostly unchanged from 1 year ago--on high intensity statin.   A1c is normal.  Uric acid is normal.

## 2019-04-25 ENCOUNTER — TELEPHONE (OUTPATIENT)
Dept: FAMILY MEDICINE CLINIC | Age: 59
End: 2019-04-25

## 2019-04-25 NOTE — TELEPHONE ENCOUNTER
Attempted to call patient to discuss pathology results. No answer. LVM. If he calls back, please inform him that this pathology showed a benign mole on his head. No cancer. I will mail him a letter as well.

## 2019-04-25 NOTE — LETTER
4/25/2019 12:15 PM 
 
Mr. Mathews Sat 
[address] [facility] [id number] Mr. Triana Mai, The biopsy I took of the mole on your scalp showed a benign (no cancer) mole. Please call me if you have any questions. Sincerely, Radha Mcpherson MD

## 2019-05-24 ENCOUNTER — OFFICE VISIT (OUTPATIENT)
Dept: SURGERY | Age: 59
End: 2019-05-24

## 2019-05-24 VITALS
DIASTOLIC BLOOD PRESSURE: 81 MMHG | OXYGEN SATURATION: 99 % | TEMPERATURE: 98.2 F | HEART RATE: 62 BPM | RESPIRATION RATE: 14 BRPM | WEIGHT: 210 LBS | HEIGHT: 70 IN | BODY MASS INDEX: 30.06 KG/M2 | SYSTOLIC BLOOD PRESSURE: 128 MMHG

## 2019-05-24 DIAGNOSIS — K40.90 INGUINAL HERNIA OF LEFT SIDE WITHOUT OBSTRUCTION OR GANGRENE: ICD-10-CM

## 2019-05-24 DIAGNOSIS — K42.0 INCARCERATED UMBILICAL HERNIA: Primary | ICD-10-CM

## 2019-05-24 NOTE — PROGRESS NOTES
1. Have you been to the ER, urgent care clinic since your last visit? Hospitalized since your last visit? No    2. Have you seen or consulted any other health care providers outside of the [address] [place] [address] since your last visit? Include any pap smears or colon screening.  No

## 2019-05-24 NOTE — H&P (VIEW-ONLY)
Surgery History and Physical 
 
Subjective:  
  
Otf Ni is a 62 y.o. middle [facility] male who presents for evaluation of an umbilical hernia. Mr. Alexis Lewis has had a bulge at his umbilicus for years. The bulge is enlarging and causing some discomfort. He denies any previous hernia repairs here or abdominal surgery. He is eating fine and moving his bowels normally. He also c/o right groin pain. He has not noticed a bulge. The pain is exacerbated by moving. He had a primary hernia repair as a child. His last CT in 2018 revealed an incarcerated umbilical hernia and a small fat-containing left inguinal hernia. Past Medical History:  
Diagnosis Date  Cataract  Gout  Hypercholesterolemia  Hypertension  Obesity (BMI 30.0-34. 9) Past Surgical History:  
Procedure Laterality Date  HX APPENDECTOMY  HX CATARACT REMOVAL Bilateral   
 HX COLONOSCOPY    
 HX HERNIA REPAIR Right Right inguinal herniorrhaphy with mesh (As child). Family History Problem Relation Age of Onset  Stroke Father Social History Tobacco Use  Smoking status: Never Smoker  Smokeless tobacco: Never Used Substance Use Topics  Alcohol use: No  
  
Prior to Admission medications Medication Sig Start Date End Date Taking? Authorizing Provider  
allopurinol (ZYLOPRIM) 300 mg tablet Take 1 Tab by mouth daily. 4/18/19  Yes Marlyn Bamberger, MD  
atorvastatin (LIPITOR) 40 mg tablet Take 1 Tab by mouth daily. 4/18/19  Yes Marlyn Bamberger, MD  
losartan (COZAAR) 100 mg tablet TAKE 1 TABLET BY MOUTH EVERY DAY 4/18/19  Yes Marlyn Bamberger, MD  
  
No Known Allergies Review of Systems: A comprehensive review of systems was negative except for that written in the History of Present Illness.  
 
Objective:  
 
Physical Exam: 
GENERAL: alert, cooperative, no distress, appears stated age, EYE: negative findings: anicteric sclera, LYMPHATIC: Cervical, supraclavicular nodes normal. , THROAT & NECK: normal, LUNG: clear to auscultation bilaterally, HEART: regular rate and rhythm, ABDOMEN: Soft, NT, ND., GROIN: On the right, there is tenderness along the crease, but no hernia. On the left, there is no hernia. Genitalia is grossly normal.  Testicles are descended bilaterally. , EXTREMITIES:  no edema, SKIN: Normal., NEUROLOGIC: negative, PSYCHIATRIC: non focal 
 
Assessment:  
 
1. Incarcerated umbilical hernia without gangrene or obstruction. 2. Occult left inguinal hernia without gangrene or obstruction. Plan:  
 
Mr. Almaz Price would like to have his hernia repaired soon so he can travel and is fine with being scheduled in 2 weeks. I discussed the risks of the procedure including bleeding, infection, wound healing problems, recurrent hernia, blood clots, seroma, injury to the bowel, spermatic cord, bladder, or neurovascular structures, and reaction to the prep or local and general anesthetic. He understands the risks; any and all questions were answered to his satisfaction. Mr. Almaz Price will be scheduled for an elective outpatient robotic-assisted laparoscopic umbilical herniorrhaphy with mesh and left inguinal herniorrhaphy with mesh, possible open under general anesthesia.

## 2019-05-24 NOTE — PROGRESS NOTES
Surgery History and Physical    Subjective:      Aba Wilkes is a 62 y.o. middle [facility] male who presents for evaluation of an umbilical hernia. Mr. Ravin Morillo has had a bulge at his umbilicus for years. The bulge is enlarging and causing some discomfort. He denies any previous hernia repairs here or abdominal surgery. He is eating fine and moving his bowels normally. He also c/o right groin pain. He has not noticed a bulge. The pain is exacerbated by moving. He had a primary hernia repair as a child. His last CT in 2018 revealed an incarcerated umbilical hernia and a small fat-containing left inguinal hernia. Past Medical History:   Diagnosis Date    Cataract     Gout     Hypercholesterolemia     Hypertension     Obesity (BMI 30.0-34. 9)      Past Surgical History:   Procedure Laterality Date    HX APPENDECTOMY      HX CATARACT REMOVAL Bilateral     HX COLONOSCOPY      HX HERNIA REPAIR Right     Right inguinal herniorrhaphy with mesh (As child). Family History   Problem Relation Age of Onset    Stroke Father      Social History     Tobacco Use    Smoking status: Never Smoker    Smokeless tobacco: Never Used   Substance Use Topics    Alcohol use: No      Prior to Admission medications    Medication Sig Start Date End Date Taking? Authorizing Provider   allopurinol (ZYLOPRIM) 300 mg tablet Take 1 Tab by mouth daily. 4/18/19  Yes Glinda Dancer, MD   atorvastatin (LIPITOR) 40 mg tablet Take 1 Tab by mouth daily. 4/18/19  Yes Glinda Dancer, MD   losartan (COZAAR) 100 mg tablet TAKE 1 TABLET BY MOUTH EVERY DAY 4/18/19  Yes Glinda Dancer, MD      No Known Allergies    Review of Systems:  A comprehensive review of systems was negative except for that written in the History of Present Illness.     Objective:     Physical Exam:  GENERAL: alert, cooperative, no distress, appears stated age, EYE: negative findings: anicteric sclera, LYMPHATIC: Cervical, supraclavicular nodes normal. , THROAT & NECK: normal, LUNG: clear to auscultation bilaterally, HEART: regular rate and rhythm, ABDOMEN: Soft, NT, ND., GROIN: On the right, there is tenderness along the crease, but no hernia. On the left, there is no hernia. Genitalia is grossly normal.  Testicles are descended bilaterally. , EXTREMITIES:  no edema, SKIN: Normal., NEUROLOGIC: negative, PSYCHIATRIC: non focal    Assessment:     1. Incarcerated umbilical hernia without gangrene or obstruction. 2. Occult left inguinal hernia without gangrene or obstruction. Plan:     Mr. Almaz Price would like to have his hernia repaired soon so he can travel and is fine with being scheduled in 2 weeks. I discussed the risks of the procedure including bleeding, infection, wound healing problems, recurrent hernia, blood clots, seroma, injury to the bowel, spermatic cord, bladder, or neurovascular structures, and reaction to the prep or local and general anesthetic. He understands the risks; any and all questions were answered to his satisfaction. Mr. Almaz Price will be scheduled for an elective outpatient robotic-assisted laparoscopic umbilical herniorrhaphy with mesh and left inguinal herniorrhaphy with mesh, possible open under general anesthesia.

## 2019-05-28 ENCOUNTER — TELEPHONE (OUTPATIENT)
Dept: SURGERY | Age: 59
End: 2019-05-28

## 2019-05-31 NOTE — PERIOP NOTES
[address], [address] [facility], [place]                            MAIN OR                                  [phone]   MAIN PRE OP                          [phone]                                                                                AMBULATORY PRE OP          [phone]  PRE-ADMISSION TESTING    [phone]     Surgery Date:   06/06/2019         Is surgery arrival time given by surgeon? NO  If NO, OUR [facility] staff will call you between 3 and 7pm the day before your surgery with your arrival time. (If your surgery is on a Monday, we will call you the Friday before.)    Call [phone] after 7pm Monday-Friday if you did not receive your arrival time. INSTRUCTIONS BEFORE YOUR SURGERY   When You  Arrive   Arrive at the 2nd [facility] on the day of your surgery  Have your insurance card, photo ID, and any copayment (if needed)     Food   and   Drink   NO food or drink after midnight the night before surgery    This means NO water, gum, mints, coffee, juice, etc.  No alcohol (beer, wine, liquor) 24 hours before and after surgery     Medications to   TAKE   Morning of Surgery   MEDICATIONS TO TAKE THE MORNING OF SURGERY WITH A SIP OF WATER:    Allopurinol, Lipitor     Medications  To  STOP      7 days before surgery    Non-Steroidal anti-inflammatory Drugs (NSAID's): for example, Ibuprofen (Advil, Motrin), Naproxen (Aleve)   Aspirin, if taking for pain    Herbal supplements, vitamins, and fish oil   Other:  (Pain medications not listed above, including Tylenol may be taken)   Blood  Thinners    If you take  Aspirin, Plavix, Coumadin, or any blood-thinning or anti-blood clot medicine, talk to the doctor who prescribed the medications for pre-operative instructions.      Bathing Clothing  Jewelry  Valuables       If you shower the morning of surgery, please do not apply anything to your skin (lotions, powders, deodorant, or makeup, especially mascara)   Follow all special bath instructions (for total joint replacement, spine and bowel surgeries)   Do not shave or trim anywhere 24 hours before surgery   Wear your hair loose or down; no pony-tails, buns, or metal hair clips   Wear loose, comfortable, clean clothes   Wear glasses instead of contacts   Leave money, valuables, and jewelry, including body piercings, at home     Going Home       or Spending the Night    SAME-DAY SURGERY: You must have a responsible adult drive you home and stay with you 24 hours after surgery   ADMITS: If your doctor is keeping you into the hospital after surgery, leave personal belongings/luggage in your car until you have a hospital room number. Hospital discharge time is 12 noon  Drivers must be here before 12 noon unless you are told differently   Special Instructions Free  Parking         Follow all instructions so your surgery wont be cancelled. Please, be on time. If a situation occurs and you are delayed the day of surgery, call [phone]     If your physical condition changes (like a fever, cold, flu, etc.) call your surgeon. The patient was contacted  via phone. Home medication reviewed and verified during PAT appointment. The patient verbalizes understanding of all instructions and does not  need reinforcement.

## 2019-06-03 ENCOUNTER — TELEPHONE (OUTPATIENT)
Dept: SURGERY | Age: 59
End: 2019-06-03

## 2019-06-03 NOTE — TELEPHONE ENCOUNTER
Left voicemail for patient regarding surgical information : DETAILS LEFT at patient request regarding : change in surgery time, date remains the same

## 2019-06-05 ENCOUNTER — ANESTHESIA EVENT (OUTPATIENT)
Dept: SURGERY | Age: 59
End: 2019-06-05
Payer: COMMERCIAL

## 2019-06-06 ENCOUNTER — ANESTHESIA (OUTPATIENT)
Dept: SURGERY | Age: 59
End: 2019-06-06
Payer: COMMERCIAL

## 2019-06-06 ENCOUNTER — HOSPITAL ENCOUNTER (OUTPATIENT)
Age: 59
Setting detail: OUTPATIENT SURGERY
Discharge: HOME | End: 2019-06-06
Attending: SURGERY | Admitting: SURGERY
Payer: COMMERCIAL

## 2019-06-06 VITALS
DIASTOLIC BLOOD PRESSURE: 72 MMHG | SYSTOLIC BLOOD PRESSURE: 128 MMHG | HEIGHT: 70 IN | HEART RATE: 70 BPM | WEIGHT: 215.17 LBS | BODY MASS INDEX: 30.8 KG/M2 | TEMPERATURE: 97.7 F | RESPIRATION RATE: 14 BRPM | OXYGEN SATURATION: 98 %

## 2019-06-06 DIAGNOSIS — K40.90 INGUINAL HERNIA OF LEFT SIDE WITHOUT OBSTRUCTION OR GANGRENE: ICD-10-CM

## 2019-06-06 DIAGNOSIS — Z87.19 S/P LAPAROSCOPIC HERNIA REPAIR: Primary | ICD-10-CM

## 2019-06-06 DIAGNOSIS — K42.0 INCARCERATED UMBILICAL HERNIA: ICD-10-CM

## 2019-06-06 DIAGNOSIS — Z98.890 S/P LAPAROSCOPIC HERNIA REPAIR: Primary | ICD-10-CM

## 2019-06-06 LAB
ATRIAL RATE: 74 BPM
CALCULATED P AXIS, ECG09: 65 DEGREES
CALCULATED R AXIS, ECG10: 35 DEGREES
CALCULATED T AXIS, ECG11: 52 DEGREES
DIAGNOSIS, 93000: NORMAL
P-R INTERVAL, ECG05: 164 MS
Q-T INTERVAL, ECG07: 356 MS
QRS DURATION, ECG06: 78 MS
QTC CALCULATION (BEZET), ECG08: 395 MS
VENTRICULAR RATE, ECG03: 74 BPM

## 2019-06-06 PROCEDURE — 77030002895 HC DEV VASC CLOSR COVD -B: Performed by: SURGERY

## 2019-06-06 PROCEDURE — 77030033188 HC TBNG FLTRD BIIFUR DISP CNMD -C: Performed by: SURGERY

## 2019-06-06 PROCEDURE — 77030035045 HC TRCR ENDOSC VRSPRT BLDLSS COVD -B: Performed by: SURGERY

## 2019-06-06 PROCEDURE — 77030009848 HC PASSR SUT SET COOP -C: Performed by: SURGERY

## 2019-06-06 PROCEDURE — C1781 MESH (IMPLANTABLE): HCPCS | Performed by: SURGERY

## 2019-06-06 PROCEDURE — 77030035048 HC TRCR ENDOSC OPTCL COVD -B: Performed by: SURGERY

## 2019-06-06 PROCEDURE — 93005 ELECTROCARDIOGRAM TRACING: CPT

## 2019-06-06 PROCEDURE — 77030011640 HC PAD GRND REM COVD -A: Performed by: SURGERY

## 2019-06-06 PROCEDURE — 74011000250 HC RX REV CODE- 250

## 2019-06-06 PROCEDURE — 77030008684 HC TU ET CUF COVD -B: Performed by: ANESTHESIOLOGY

## 2019-06-06 PROCEDURE — 77030035236 HC SUT PDS STRATFX BARB J&J -B: Performed by: SURGERY

## 2019-06-06 PROCEDURE — 77030035277 HC OBTRTR BLDELSS DISP INTU -B: Performed by: SURGERY

## 2019-06-06 PROCEDURE — 74011250636 HC RX REV CODE- 250/636: Performed by: ANESTHESIOLOGY

## 2019-06-06 PROCEDURE — 77030037032 HC INSRT SCIS CLICKLLINE DISP STOR -B: Performed by: SURGERY

## 2019-06-06 PROCEDURE — 88302 TISSUE EXAM BY PATHOLOGIST: CPT

## 2019-06-06 PROCEDURE — 74011250636 HC RX REV CODE- 250/636

## 2019-06-06 PROCEDURE — 74011250636 HC RX REV CODE- 250/636: Performed by: SURGERY

## 2019-06-06 PROCEDURE — 74011000250 HC RX REV CODE- 250: Performed by: SURGERY

## 2019-06-06 PROCEDURE — 76010000877 HC OR TIME 2.5 TO 3HR INTENSV - TIER 2: Performed by: SURGERY

## 2019-06-06 PROCEDURE — 77030013079 HC BLNKT BAIR HGGR 3M -A: Performed by: ANESTHESIOLOGY

## 2019-06-06 PROCEDURE — 76060000036 HC ANESTHESIA 2.5 TO 3 HR: Performed by: SURGERY

## 2019-06-06 PROCEDURE — 77030031139 HC SUT VCRL2 J&J -A: Performed by: SURGERY

## 2019-06-06 PROCEDURE — 77030018673: Performed by: SURGERY

## 2019-06-06 PROCEDURE — 77030032490 HC SLV COMPR SCD KNE COVD -B

## 2019-06-06 PROCEDURE — 77030020263 HC SOL INJ SOD CL0.9% LFCR 1000ML: Performed by: SURGERY

## 2019-06-06 PROCEDURE — 77030016151 HC PROTCTR LNS DFOG COVD -B: Performed by: SURGERY

## 2019-06-06 PROCEDURE — 74011250637 HC RX REV CODE- 250/637: Performed by: SURGERY

## 2019-06-06 PROCEDURE — 77030020703 HC SEAL CANN DISP INTU -B: Performed by: SURGERY

## 2019-06-06 PROCEDURE — 77030002933 HC SUT MCRYL J&J -A: Performed by: SURGERY

## 2019-06-06 PROCEDURE — 77030009852 HC PCH RTVR ENDOSC COVD -B: Performed by: SURGERY

## 2019-06-06 PROCEDURE — 77030020782 HC GWN BAIR PAWS FLX 3M -B

## 2019-06-06 PROCEDURE — 77030034849: Performed by: SURGERY

## 2019-06-06 PROCEDURE — 76210000017 HC OR PH I REC 1.5 TO 2 HR: Performed by: SURGERY

## 2019-06-06 PROCEDURE — 76210000021 HC REC RM PH II 0.5 TO 1 HR: Performed by: SURGERY

## 2019-06-06 PROCEDURE — 77030018836 HC SOL IRR NACL ICUM -A: Performed by: SURGERY

## 2019-06-06 PROCEDURE — 77030019908 HC STETH ESOPH SIMS -A: Performed by: ANESTHESIOLOGY

## 2019-06-06 DEVICE — HYDROPHILIC ANATOMICAL MESH,LEFT SIDE, POLYESTER
Type: IMPLANTABLE DEVICE | Site: ABDOMEN | Status: FUNCTIONAL
Brand: PARIETEX

## 2019-06-06 RX ORDER — SODIUM CHLORIDE, SODIUM LACTATE, POTASSIUM CHLORIDE, CALCIUM CHLORIDE 600; 310; 30; 20 MG/100ML; MG/100ML; MG/100ML; MG/100ML
100 INJECTION, SOLUTION INTRAVENOUS CONTINUOUS
Status: DISCONTINUED | OUTPATIENT
Start: 2019-06-06 | End: 2019-06-06 | Stop reason: HOSPADM

## 2019-06-06 RX ORDER — ROCURONIUM BROMIDE 10 MG/ML
INJECTION, SOLUTION INTRAVENOUS AS NEEDED
Status: DISCONTINUED | OUTPATIENT
Start: 2019-06-06 | End: 2019-06-06 | Stop reason: HOSPADM

## 2019-06-06 RX ORDER — GLYCOPYRROLATE 0.2 MG/ML
INJECTION INTRAMUSCULAR; INTRAVENOUS AS NEEDED
Status: DISCONTINUED | OUTPATIENT
Start: 2019-06-06 | End: 2019-06-06 | Stop reason: HOSPADM

## 2019-06-06 RX ORDER — HYDROCODONE BITARTRATE AND ACETAMINOPHEN 5; 325 MG/1; MG/1
1 TABLET ORAL ONCE
Status: COMPLETED | OUTPATIENT
Start: 2019-06-06 | End: 2019-06-06

## 2019-06-06 RX ORDER — PROPOFOL 10 MG/ML
INJECTION, EMULSION INTRAVENOUS AS NEEDED
Status: DISCONTINUED | OUTPATIENT
Start: 2019-06-06 | End: 2019-06-06 | Stop reason: HOSPADM

## 2019-06-06 RX ORDER — NEOSTIGMINE METHYLSULFATE 1 MG/ML
INJECTION INTRAVENOUS AS NEEDED
Status: DISCONTINUED | OUTPATIENT
Start: 2019-06-06 | End: 2019-06-06 | Stop reason: HOSPADM

## 2019-06-06 RX ORDER — DEXAMETHASONE SODIUM PHOSPHATE 4 MG/ML
INJECTION, SOLUTION INTRA-ARTICULAR; INTRALESIONAL; INTRAMUSCULAR; INTRAVENOUS; SOFT TISSUE AS NEEDED
Status: DISCONTINUED | OUTPATIENT
Start: 2019-06-06 | End: 2019-06-06 | Stop reason: HOSPADM

## 2019-06-06 RX ORDER — SUCCINYLCHOLINE CHLORIDE 20 MG/ML
INJECTION INTRAMUSCULAR; INTRAVENOUS AS NEEDED
Status: DISCONTINUED | OUTPATIENT
Start: 2019-06-06 | End: 2019-06-06 | Stop reason: HOSPADM

## 2019-06-06 RX ORDER — KETOROLAC TROMETHAMINE 30 MG/ML
INJECTION, SOLUTION INTRAMUSCULAR; INTRAVENOUS AS NEEDED
Status: DISCONTINUED | OUTPATIENT
Start: 2019-06-06 | End: 2019-06-06 | Stop reason: HOSPADM

## 2019-06-06 RX ORDER — LABETALOL HYDROCHLORIDE 5 MG/ML
INJECTION, SOLUTION INTRAVENOUS AS NEEDED
Status: DISCONTINUED | OUTPATIENT
Start: 2019-06-06 | End: 2019-06-06 | Stop reason: HOSPADM

## 2019-06-06 RX ORDER — LIDOCAINE HYDROCHLORIDE 10 MG/ML
0.1 INJECTION, SOLUTION EPIDURAL; INFILTRATION; INTRACAUDAL; PERINEURAL AS NEEDED
Status: DISCONTINUED | OUTPATIENT
Start: 2019-06-06 | End: 2019-06-06 | Stop reason: HOSPADM

## 2019-06-06 RX ORDER — LIDOCAINE HYDROCHLORIDE 20 MG/ML
INJECTION, SOLUTION EPIDURAL; INFILTRATION; INTRACAUDAL; PERINEURAL AS NEEDED
Status: DISCONTINUED | OUTPATIENT
Start: 2019-06-06 | End: 2019-06-06 | Stop reason: HOSPADM

## 2019-06-06 RX ORDER — FENTANYL CITRATE 50 UG/ML
INJECTION, SOLUTION INTRAMUSCULAR; INTRAVENOUS AS NEEDED
Status: DISCONTINUED | OUTPATIENT
Start: 2019-06-06 | End: 2019-06-06 | Stop reason: HOSPADM

## 2019-06-06 RX ORDER — CEFAZOLIN SODIUM/WATER 2 G/20 ML
2 SYRINGE (ML) INTRAVENOUS ONCE
Status: COMPLETED | OUTPATIENT
Start: 2019-06-06 | End: 2019-06-06

## 2019-06-06 RX ORDER — ONDANSETRON 2 MG/ML
INJECTION INTRAMUSCULAR; INTRAVENOUS AS NEEDED
Status: DISCONTINUED | OUTPATIENT
Start: 2019-06-06 | End: 2019-06-06 | Stop reason: HOSPADM

## 2019-06-06 RX ORDER — MIDAZOLAM HYDROCHLORIDE 1 MG/ML
INJECTION, SOLUTION INTRAMUSCULAR; INTRAVENOUS AS NEEDED
Status: DISCONTINUED | OUTPATIENT
Start: 2019-06-06 | End: 2019-06-06 | Stop reason: HOSPADM

## 2019-06-06 RX ORDER — HYDROMORPHONE HYDROCHLORIDE 1 MG/ML
.25-1 INJECTION, SOLUTION INTRAMUSCULAR; INTRAVENOUS; SUBCUTANEOUS
Status: DISCONTINUED | OUTPATIENT
Start: 2019-06-06 | End: 2019-06-06 | Stop reason: HOSPADM

## 2019-06-06 RX ORDER — BUPIVACAINE HYDROCHLORIDE 5 MG/ML
INJECTION, SOLUTION EPIDURAL; INTRACAUDAL AS NEEDED
Status: DISCONTINUED | OUTPATIENT
Start: 2019-06-06 | End: 2019-06-06 | Stop reason: HOSPADM

## 2019-06-06 RX ORDER — HYDROCODONE BITARTRATE AND ACETAMINOPHEN 5; 325 MG/1; MG/1
1 TABLET ORAL
Qty: 20 TAB | Refills: 0 | Status: SHIPPED | OUTPATIENT
Start: 2019-06-06 | End: 2019-06-09

## 2019-06-06 RX ADMIN — SODIUM CHLORIDE, POTASSIUM CHLORIDE, SODIUM LACTATE AND CALCIUM CHLORIDE 100 ML/HR: 600; 310; 30; 20 INJECTION, SOLUTION INTRAVENOUS at 09:10

## 2019-06-06 RX ADMIN — LABETALOL HYDROCHLORIDE 5 MG: 5 INJECTION, SOLUTION INTRAVENOUS at 10:33

## 2019-06-06 RX ADMIN — Medication 2 G: at 10:14

## 2019-06-06 RX ADMIN — PROPOFOL 50 MG: 10 INJECTION, EMULSION INTRAVENOUS at 10:12

## 2019-06-06 RX ADMIN — GLYCOPYRROLATE 0.4 MG: 0.2 INJECTION INTRAMUSCULAR; INTRAVENOUS at 12:30

## 2019-06-06 RX ADMIN — FENTANYL CITRATE 100 MCG: 50 INJECTION, SOLUTION INTRAMUSCULAR; INTRAVENOUS at 10:07

## 2019-06-06 RX ADMIN — MIDAZOLAM HYDROCHLORIDE 3 MG: 1 INJECTION, SOLUTION INTRAMUSCULAR; INTRAVENOUS at 09:58

## 2019-06-06 RX ADMIN — KETOROLAC TROMETHAMINE 15 MG: 30 INJECTION, SOLUTION INTRAMUSCULAR; INTRAVENOUS at 12:23

## 2019-06-06 RX ADMIN — HYDROMORPHONE HYDROCHLORIDE 1 MG: 1 INJECTION, SOLUTION INTRAMUSCULAR; INTRAVENOUS; SUBCUTANEOUS at 13:08

## 2019-06-06 RX ADMIN — SUCCINYLCHOLINE CHLORIDE 100 MG: 20 INJECTION INTRAMUSCULAR; INTRAVENOUS at 10:10

## 2019-06-06 RX ADMIN — SODIUM CHLORIDE, POTASSIUM CHLORIDE, SODIUM LACTATE AND CALCIUM CHLORIDE 100 ML/HR: 600; 310; 30; 20 INJECTION, SOLUTION INTRAVENOUS at 09:00

## 2019-06-06 RX ADMIN — DEXAMETHASONE SODIUM PHOSPHATE 4 MG: 4 INJECTION, SOLUTION INTRA-ARTICULAR; INTRALESIONAL; INTRAMUSCULAR; INTRAVENOUS; SOFT TISSUE at 10:22

## 2019-06-06 RX ADMIN — ROCURONIUM BROMIDE 30 MG: 10 INJECTION, SOLUTION INTRAVENOUS at 10:20

## 2019-06-06 RX ADMIN — HYDROCODONE BITARTRATE AND ACETAMINOPHEN 1 TABLET: 5; 325 TABLET ORAL at 15:21

## 2019-06-06 RX ADMIN — ROCURONIUM BROMIDE 10 MG: 10 INJECTION, SOLUTION INTRAVENOUS at 11:11

## 2019-06-06 RX ADMIN — PROPOFOL 150 MG: 10 INJECTION, EMULSION INTRAVENOUS at 10:09

## 2019-06-06 RX ADMIN — NEOSTIGMINE METHYLSULFATE 3 MG: 1 INJECTION INTRAVENOUS at 12:30

## 2019-06-06 RX ADMIN — ONDANSETRON 4 MG: 2 INJECTION INTRAMUSCULAR; INTRAVENOUS at 12:26

## 2019-06-06 RX ADMIN — MIDAZOLAM HYDROCHLORIDE 2 MG: 1 INJECTION, SOLUTION INTRAMUSCULAR; INTRAVENOUS at 10:00

## 2019-06-06 RX ADMIN — FENTANYL CITRATE 50 MCG: 50 INJECTION, SOLUTION INTRAMUSCULAR; INTRAVENOUS at 12:09

## 2019-06-06 RX ADMIN — ROCURONIUM BROMIDE 10 MG: 10 INJECTION, SOLUTION INTRAVENOUS at 10:08

## 2019-06-06 RX ADMIN — FENTANYL CITRATE 50 MCG: 50 INJECTION, SOLUTION INTRAMUSCULAR; INTRAVENOUS at 10:30

## 2019-06-06 RX ADMIN — ROCURONIUM BROMIDE 10 MG: 10 INJECTION, SOLUTION INTRAVENOUS at 11:58

## 2019-06-06 RX ADMIN — LIDOCAINE HYDROCHLORIDE 40 MG: 20 INJECTION, SOLUTION EPIDURAL; INFILTRATION; INTRACAUDAL; PERINEURAL at 10:08

## 2019-06-06 NOTE — DISCHARGE INSTRUCTIONS
Patient Education        Hernia Repair: What to Expect at [address] are likely to have pain for the next few days. You may also feel like you have the flu, and you may have a low fever and feel tired and nauseated. This is common. You should feel better after a few days and will probably feel much better in 7 days. For several weeks you may feel twinges or pulling in the hernia repair when you move. You may have some bruising on the scrotum and along the penis. This is normal.  Men will need to wear a jockstrap or briefs, not boxers, for scrotal support for several days after a groin (inguinal) hernia repair. [employer] bicycle shorts may provide good support. This care sheet gives you a general idea about how long it will take for you to recover, but each person recovers at a different pace. Follow the steps below to get better as quickly as possible. How can you care for yourself at home? Activity    · Rest when you feel tired. Getting enough sleep will help you recover.     · Try to walk each day. Start by walking a little more than you did the day before. Bit by bit, increase the amount you walk. Walking boosts blood flow and helps prevent pneumonia and constipation.     · Avoid strenuous activities, such as biking, jogging, weight lifting, or aerobic exercise, until your doctor says it is okay.     · Avoid lifting anything over 30 pounds or that would make you strain for 6 weeks! This may include heavy grocery bags and milk containers, a heavy briefcase or backpack, cat litter or dog food bags, a vacuum , or a child.     · You may drive after 3 days and when you are no longer taking narcotic pain medicine and can quickly move your foot from the gas pedal to the brake!   You must also be able to sit comfortably for a long period of time, even if you do not plan to go far because you might get caught in traffic.     · Most people are able to return to work within 1 to 2 weeks after surgery.     · You may shower 24 hours after surgery, if your doctor okays it. Pat the cuts (incisions) dry. Do not take a bath for the first 2 weeks, and until after your doctor.     ·    Diet    · You can eat your normal diet. If your stomach is upset, try bland, low-fat foods like plain rice, broiled chicken, toast, and yogurt.     · Drink plenty of fluids (unless your doctor tells you not to).     · You may notice that your bowel movements are not regular right after your surgery. This is common. Avoid constipation and straining with bowel movements. You may want to take a fiber supplement every day. If you have not had a bowel movement after a couple of days, take Miralax, Milk of Magnesia, prune juice, or other laxative. Medicines    · You can restart your medicines. Your doctor will also give you instructions about taking any new medicines.     · If you take blood thinners, such as warfarin (Coumadin), be sure to talk to your doctor. Your doctor will tell you if and when to start taking those medicines again. Make sure that you understand exactly what your doctor wants you to do.     · Be safe with medicines. Take pain medicines exactly as directed. ? If the doctor gave you a prescription medicine for pain, take it as prescribed. ? If you are not taking a prescription pain medicine, take an over-the-counter medicine such as acetaminophen (Tylenol), ibuprofen (Advil, Motrin), or naproxen (Aleve). Read and follow all instructions on the label. ? Do not take two or more pain medicines at the same time unless the doctor told you to. Many pain medicines have acetaminophen, which is Tylenol. Too much acetaminophen (Tylenol) can be harmful.     · If your doctor prescribed antibiotics, take them as directed. Do not stop taking them just because you feel better.   You need to take the full course of antibiotics.     · If you think your pain medicine is making you sick to your stomach:  ? Take your medicine after meals (unless your doctor has told you not to). ? Ask your doctor for a different pain medicine. Incision care    · Remove the bandages on Saturday, 6/8. You may leave your incisions uncovered. · If you have strips of tape on the cut (incision) the doctor made, leave the tape on for a week or until it falls off.     · Keep the incisions clean and dry. · If there is any drainage, then cover the incisions with a dry bandage. Change the bandage daily as needed.     · Wash the area daily with warm, soapy water and pat it dry. · You may apply an ice pack to your groin for the first 48 hours. Every hour for 10 minutes. Wrap the ice pack in a towel. Do not apply directly to your skin! Follow-up care is a key part of your treatment and safety. Be sure to make and go to all appointments, and call your doctor if you are having problems. It's also a good idea to know your test results and keep a list of the medicines you take. When should you call for help? Call 911 anytime you think you may need emergency care. For example, call if:    · You passed out (lost consciousness).     · You are short of breath.    Call your doctor now or seek immediate medical care if:    · You have abdominal or groin pain that does not get better after you take pain medicine.     · You are sick to your stomach and cannot keep fluids down.     · You have signs of a blood clot in your leg (called a deep vein thrombosis), such as:  ? Pain in your calf, back of the knee, thigh, or groin. ? Redness and swelling in your leg or groin.     · You cannot pass stool, gas, or urine.     · Bright red blood has soaked through the bandage over your incision.     · You have loose stitches, or your incision comes open.     · You have signs of infection, such as:  ? Increased pain, swelling, warmth, or redness. ? Red streaks leading from the incision. ? Pus draining from the incision. ?  A fever > 101.    Watch closely for any changes in your health, and be sure to contact your doctor if you have any problems. Where can you learn more? Go to http://jess-danyel.info/. Enter G222 in the search box to learn more about \"Hernia Repair: What to Expect at Home. \"  Current as of: March 27, 2018  Content Version: 11.9  © 4307-9551 [employer]. Care instructions adapted under license by [employer] (which disclaims liability or warranty for this information). If you have questions about a medical condition or this instruction, always ask your healthcare professional. [place] [address] any warranty or liability for your use of this information. Aracelis Gomez. Robert Arizmendi MD, FACS  General Surgery at [address] [place] [address], [address] [place] [address] [facility]  [phone]  Fax [phone]    DISCHARGE SUMMARY from your Nurse    The following personal items collected during your admission are returned to you:   Dental Appliance: Dental Appliances: None  Vision: Visual Aid: Glasses  Hearing Aid:    Jewelry: Jewelry: None  Clothing: Clothing: Other (comment)(street clothes to locker)  Other Valuables: Other Valuables: Eyeglasses, [employer], [address] [facility], Other (comment)(earbuds)  Valuables sent to safe: Personal Items Sent to Safe: none    PATIENT INSTRUCTIONS:    After general anesthesia or intravenous sedation, for 24 hours or while taking prescription Narcotics:  · Limit your activities  · Do not drive and operate hazardous machinery  · Do not make important personal or business decisions  · Do  not drink alcoholic beverages  · If you have not urinated within 8 hours after discharge, please contact your surgeon on call.     Report the following to your surgeon:  · Excessive pain, swelling, redness or odor of or around the surgical area  · Temperature over 100.5  · Nausea and vomiting lasting longer than 4 hours or if unable to take medications  · Any signs of decreased circulation or nerve impairment to extremity: change in color, persistent  numbness, tingling, coldness or increase pain  · Any questions    COUGH AND DEEP BREATHE    Breathing deep and coughing are very important exercises to do after surgery. Deep breathing and coughing open the little air tubes and air sacks in your lungs. You take deep breaths every day. You may not even notice - it is just something you do when you sigh or yawn. It is a natural exercise you do to keep these air passages open. After surgery, take deep breaths and cough, on purpose. Coughing and deep breathing help prevent bronchitis and pneumonia after surgery. If you had chest or belly surgery, use a pillow as a \"hug eddie\" and hold it tightly to your chest or belly when you cough. DIRECTIONS:  6. Take 10 to 15 slow deep breaths every hour while awake. 7. Breathe in deeply, and hold it for 2 seconds. 8. Exhale slowly through puckered lips, like blowing up a balloon. 9. After every 4th or 5th deep breath, hug your pillow to your chest or belly and give a hard, deep cough. Yes, it will probably hurt. But doing this exercise is very important part of healing after surgery. Take your pain medicine to help you do this exercise without too much pain. IF YOU HAVE BEEN DIAGNOSED WITH SLEEP APNEA, PLEASE USE YOUR SLEEP APNEA DEVICE OR CPAP MACHINE WHEN YOU INTEND TO NAP AFTER TAKING PAIN MEDICATION. Ankle Pumps    Ankle pumps increase the circulation of oxygenated blood to your lower extremities and decrease your risk for circulation problems such as blood clots. They also stretch the muscles, tendons and ligaments in your foot and ankle, and prevent joint contracture in the ankle and foot, especially after surgeries on the legs. It is important to do ankle pump exercises regularly after surgery because immobility increases your risk for developing a blood clot.   Your doctor may also have you take an Aspirin for the next few days as well. If your doctor did not ask you to take an Aspirin, consult with him before starting Aspirin therapy on your own. Slowly point your foot forward, feeling the muscles on the top of your lower leg stretch, and hold this position for 5 seconds. Next, pull your foot back toward you as far as possible, stretching the calf muscles, and hold that position for 5 seconds. Repeat with the other foot. Perform 10 repetitions every hour while awake for both ankles if possible (down and then up with the foot once is one repetition). You should feel gentle stretching of the muscles in your lower leg when doing this exercise. If you feel pain, or your range of motion is limited, don't  Push too hard. Only go the limit your joint and muscles will let you go. If you have increasing pain, progressively worsening leg warmth or swelling, STOP the exercise and call your doctor. Below is information about the medications your doctor is prescribing after your visit:    Other information in your discharge envelope:  []     PRESCRIPTIONS  []     PHYSICAL THERAPY PRESCRIPTION  []     APPOINTMENT CARDS  []     Regional Anesthesia Pamphlet for block or block with On-Q Catheter from Anesthesia Service  []     Medical device information sheets/pamphlets from their    []     School/work excuse note. []     /parent work excuse note. These are general instructions for a healthy lifestyle:    *  Please give a list of your current medications to your Primary Care Provider. *  Please update this list whenever your medications are discontinued, doses are      changed, or new medications (including over-the-counter products) are added. *  Please carry medication information at all times in case of emergency situations.     About Smoking  No smoking / No tobacco products / Avoid exposure to second hand smoke    Surgeon General's Warning:  Quitting smoking now greatly reduces serious risk to your health. Obesity, smoking, and sedentary lifestyle greatly increases your risk for illness and disease. A healthy diet, regular physical exercise & weight monitoring are important for maintaining a healthy lifestyle. Congestive Heart Failure  You may be retaining fluid if you have a history of heart failure or if you experience any of the following symptoms:  Weight gain of 3 pounds or more overnight or 5 pounds in a week, increased swelling in our hands or feet or shortness of breath while lying flat in bed. Please call your doctor as soon as you notice any of these symptoms; do not wait until your next office visit. Recognize signs and symptoms of STROKE:  F - face looks uneven  A - arms unable to move or move even  S - speech slurred or non-existent  T - time-call 911 as soon as signs and symptoms begin-DO NOT go         Back to bed or wait to see if you get better-TIME IS BRAIN. Warning signs of HEART ATTACK  Call 911 if you have these symptoms    · Chest discomfort. Most heart attacks involve discomfort in the center of the chest that lasts more than a few minutes, or that goes away and comes back. It can feel like uncomfortable pressure, squeezing, fullness, or pain. · Discomfort in other areas of the upper body. Symptoms can include pain or discomfort in one or both        Arms, the back, neck, jaw, or stomach. ·  Shortness of breath with or without chest discomfort. · Other signs may include breaking out in a cold sweat, nausea, or lightheadedness    Don't wait more than five minutes to call 911 - MINUTES MATTER! Fast action can save your life. Calling 911 is almost always the fastest way to get lifesaving treatment. Emergency Medical Services staff can begin treatment when they arrive - up to an hour sooner than if someone gets to the hospital by car.       [facility] MEDICATION AND SIDE EFFECT GUIDE    The [facility] MEDICATION AND SIDE EFFECT GUIDE was provided to the PATIENT AND CARE PROVIDER.   Information provided includes instruction about drug purpose and common side effects for the following medications:     HYDROcodone-acetaminophen 5-325 mg per tablet (NORCO)

## 2019-06-06 NOTE — INTERVAL H&P NOTE
H&P Update:  Tho Noonan was seen and examined. History and physical has been reviewed. The patient has been examined.  There have been no significant clinical changes since the completion of the originally dated History and Physical.

## 2019-06-06 NOTE — ANESTHESIA POSTPROCEDURE EVALUATION
Procedure(s):  ROBOTIC ASSISTED OPEN UMBILICAL HERNIORRHAPHY AND LAPAROSCOPIC LEFT INGUINAL HERNIORRHAPHY WITH MESH. general    Anesthesia Post Evaluation      Multimodal analgesia: multimodal analgesia not used between 6 hours prior to anesthesia start to PACU discharge  Patient location during evaluation: PACU  Patient participation: complete - patient participated  Level of consciousness: awake  Pain management: adequate  Airway patency: patent  Anesthetic complications: no  Cardiovascular status: acceptable, blood pressure returned to baseline and hemodynamically stable  Respiratory status: acceptable  Hydration status: acceptable  Post anesthesia nausea and vomiting:  controlled      Vitals Value Taken Time   /71 6/6/2019  2:30 PM   Temp 36.4 °C (97.5 °F) 6/6/2019 12:54 PM   Pulse 68 6/6/2019  2:38 PM   Resp 10 6/6/2019  2:38 PM   SpO2 99 % 6/6/2019  2:38 PM   Vitals shown include unvalidated device data.

## 2019-06-06 NOTE — BRIEF OP NOTE
BRIEF OPERATIVE NOTE    Date of Procedure: 6/6/2019   Preoperative Diagnosis: 1. INCARCERATED UMBILICAL HERNIA., 2. LEFT INGUINAL HERNIA. Postoperative Diagnosis: 2. INCARCERATED UMBILICAL HERNIA., 2. DIRECT LEFT INGUINAL HERNIA., 3. LIPOMA OF THE LEFT SPERMATIC CORD. Procedure(s):  1. ROBOTIC ASSISTED LAPAROSCOPIC LEFT INGUINAL HERNIORRHAPHY WITH PARIETEX MESH., 2. OPEN PRIMARY REPAIR OF INCARCERATED UMBILICAL HERNIA. Surgeon(s) and Role:     * Fay Denver., MD - Primary         Surgical Assistant: Atul Tafoya., 2. Ion Coyle. Surgical Staff:  Circ-1: Alesia Mckeon RN; Murphy Matute RN  Scrub Tech-1: Nebecky Macias Tech-Relief: Qing Grey  Surg Asst-1: Jhony Dhillon  Surg Asst-Relief: Luli Coyle  Event Time In Time Out   Incision Start 1027    Incision Close       Anesthesia: General   Estimated Blood Loss: Less than 25 ml. Specimens:   ID Type Source Tests Collected by Time Destination   1 : umbilical hernia sac Preservative Hernia Sac  Fay Denver., MD 6/6/2019 1214 Pathology      Findings: 1. An approximately 1.5 x 1 cm umbilical fascial defect with incarcerated omentum., 2. A direct left inguinal hernia., 3. A large lipoma of the left spermatic cord. Complications: None. Implants:   Implant Name Type Inv. Item Serial No.  Lot No. LRB No. Used Action   MESH WM 6X4IN LT -- PARIETEX - SNA  MESH WM 6X4IN LT -- PARIETEX NA COVIDIEN  SURGICAL [id number] N/A 1 Implanted     Disposition: Stable to the .

## 2019-06-06 NOTE — ANESTHESIA PREPROCEDURE EVALUATION
Relevant Problems   No relevant active problems       Anesthetic History   No history of anesthetic complications            Review of Systems / Medical History  Patient summary reviewed, nursing notes reviewed and pertinent labs reviewed    Pulmonary  Within defined limits                 Neuro/Psych   Within defined limits           Cardiovascular    Hypertension: well controlled          Hyperlipidemia         GI/Hepatic/Renal  Within defined limits              Endo/Other        Obesity and arthritis    Comments: Gout Other Findings              Physical Exam    Airway  Mallampati: II    Neck ROM: normal range of motion   Mouth opening: Normal     Cardiovascular    Rhythm: regular  Rate: normal         Dental  No notable dental hx       Pulmonary                 Abdominal         Other Findings            Anesthetic Plan    ASA: 2  Anesthesia type: general          Induction: Intravenous  Anesthetic plan and risks discussed with: Patient      Informed consent obtained.

## 2019-06-07 ENCOUNTER — TELEPHONE (OUTPATIENT)
Dept: SURGERY | Age: 59
End: 2019-06-07

## 2019-06-07 NOTE — TELEPHONE ENCOUNTER
Called pt to follow up after surgery. Patient identified with three patient identifiers. How are you doing:ok  Are you having any pain:discomfort  Are you taking pain meds:no, otc       If yes- recommended any OTC constipation treatment if needed  Have you had any nausea or vomiting:both yesterday but gone today  How is your appetite (eating & drinking):decreased but eating anyway  Normal BM & urine output:urine but no BM yet  Pt notified to take dressing off after 48 hrs: informed  Do they have a drain:no  Are they keeping track of output:n/a  Did they review their discharge instructions:yes  Any other concerns:no  Your follow up office appt is:  6/25/2019 10:15 AM Nito Longoria MD     Pt did not voice any other concerns. Pt will call with any problems or questions.

## 2019-06-08 NOTE — OP NOTES
Prabhu Mcnair [facility] 79  OPERATIVE REPORT    Name:  Karlos Huerta  MR#:  [id number]  :  1960  ACCOUNT #:  [de-identified]  DATE OF SERVICE:  2019      SURGEON:    Gita Gibson. Sabrina Maloney MD.    ASSISTANTS:  1.  Vicente Mullins. 2.  Deion Montejo ANESTHESIA:  1. General endotracheal.  2.  0.5% Marcaine. PREOPERATIVE DIAGNOSES:  1. Incarcerated umbilical hernia. 2.  Left inguinal hernia. POSTOPERATIVE DIAGNOSES:  1. Incarcerated umbilical hernia. 2.  Direct left inguinal hernia. 3.  Lipoma of the left spermatic cord. PROCEDURES:  1.  Robotic-assisted laparoscopic left inguinal herniorrhaphy with Parietex mesh. 2.  Open primary repair of incarcerated umbilical hernia. INDICATION:    Mr. Alexandria Mary is a 64year-old [facility] gentleman who was recently seen in the office for an umbilical hernia. The hernia has been present for several years and is now enlarging and causing discomfort. He had a CAT scan previously which revealed the incarcerated umbilical as well as a small fat-containing left inguinal hernia which he desires to have repaired. He presents at this time for his elective procedure. PROCEDURE IN DETAIL:    The patient was seen preoperatively in the holding area. The risks, benefits, and expected outcomes were discussed with the patient, and all questions were answered satisfactorily. The patient concurred with the proposed plan, giving informed consent. The patient identified the left side as the correct side, and this was verified by me. The left side was then marked. The patient was shaved in the holding area. The patient was taken to the OR. The patient was identified as Amrik Galloway, and the procedure verified as Robotic-assisted laparoscopic umbilical and left inguinal herniorrhapies with mesh, possible open. The patient was placed on the OR table in the supine position. Prior to induction, antibiotic prophylaxis was administered. General anesthesia was administered and tolerated well. Both arms were tucked. The patient's abdomen and groin were prepped with ChloraPrep and draped in the usual sterile fashion with Nancy Dubs placed over the skin. A timeout was performed, and the above information was confirmed. The local anesthetic was injected into the skin and subcutaneous tissue in the midline of the upper abdomen. Using a #15 blade, an incision was made. Towel clips were used to elevate the patient's abdominal wall. Using the Optiview technique, an 8 mm trocar was introduced into the abdomen. Insufflation was provided through this trocar to establish a pneumoperitoneum of 15 mmHg, which the patient tolerated. The abdominal cavity was examined, and the omentum was incarcerated within the umbilical hernia sac. There were also adhesions from the sigmoid colon to the abdominal wall in the pelvis. The hernia was not really identifiable on the left side. There was no hernia on the right side. The local anesthetic was injected at the remaining intended trocar sites. Two more 8 mm trocars were placed in the left upper quadrant and right upper quadrant, respectively, under direct and laparoscopic vision. The patient was placed in the Trendelenburg position. The robotic arms were docked. At this time, I scrubbed out and went to the surgeon's console. I took control of the robotic arms for the majority of the procedure. The omentum was reduced from the umbilical hernia sac using general traction, cautery, and scissors. The umbilical fascial defect measured approximately 1.5 x 1 cm with the longest dimension in the longitudinal direction. Attention was turned to the left side. My assistant localized the anterior-superior iliac spine. The peritoneum was scored a few centimeters superior and medial to this point.   The peritoneum was incised at this level, starting at the left medial umbilical ligament and extending to the scored point. Dissection was initiated in the preperitoneal plane, elevating the transversalis fascia towards the abdominal wall. Dissection was continued caudally until Shaun's ligament was identified. The bladder was reflected medially. No direct, femoral, or obturator hernias were noted. As dissection was continued laterally into the space of Bogros, the iliopubic tract was defined. The spermatic cord was skeletonized, but no indirect hernia sac was identified. There was a large lipoma of the cord which was reduced. I attempted to dissect it free from the cord, but it appeared to fat within the cord rather than a distinct lipoma. Hemostasis was confirmed. A left-sided Parietex mesh was chosen for the repair  The mesh was folded up and placed into the preperitoneal space. The mesh was opened to cover all known and potential defects, while encircling the spermatic cord. The lipoma was placed posterior to the mesh. The mesh was secured inferiorly to Shaun's ligament, medially to the rectus abdominis muscle, and laterally to the abdominal wall with interrupted 3-0 Vicryl. The needle was removed. The pneumoperitoneum was taken down to 10 mmHg. The peritoneum was closed with a running 3-0 V-Loc. The needle was removed. The peritoneal flap was examined, and no defects were noted. The underlying bowel was examined, and no injuries were noted. The robotic arms were undocked. The patient was placed flat. The abdomen was decompressed. The trocars were removed. At this time, I scrubbed back in and went to the patient's bedside. Attention was turned to the umbilicus. A curvilinear infraumbilical incision was made. Dissection was taken down to the fascia. In doing this, the hernia sac was entered. The hernia sac was encircled with a Idalmis clamp. The hernia sac was dissected off the umbilicus, excised, and passed off as a specimen. The fascia was cleaned off superficially.   The umbilical fascia defect was closed primarily with interrupted #1 Prolene in Smead-Troncoso fashion. Hemostasis was obtained within all wounds as needed with cautery. The umbilicus was tacked down to the fascia with interrupted 3-0 Vicryl. The subcutaneous tissue was closed with interrupted 3-0. The infraumbilical incision as well as the trocar sites were closed with 4-0 Monocryl in the usual running subcuticular fashion. All wounds were cleaned and dried, and Steri-Strips and bandages were applied. The left testicle was pulled down in the usual standard fashion. The patient was extubated in the room. ESTIMATED BLOOD LOSS:    Less than 25 mL. SPECIMEN:    Umbilical hernia sac, which was sent off as a permanent specimen to Pathology. IMPLANTS:    A left-sided Parietex mesh was placed into the preperitoneal space, covering the left myopectineal orifice. FINDINGS:  1. An approximately 1.5 x 1 cm umbilical fascial defect with incarcerated omentum. 2.  A direct left inguinal hernia. 3.  A large lipoma of the left spermatic cord. 4.  No right inguinal hernia. COUNTS:    All sponge, needle, and instrument counts were correct x 2. COMPLICATIONS:    None. DISPOSITION:    The patient was transferred to the recovery room in stable condition, having tolerated the procedure and anesthesia well.         Miguelito Alex MD [id number]  D:  06/07/2019 11:23  T:  06/07/2019 21:58  JOB #:  [id number]  CC:  Libertad Ku MD

## 2019-06-18 ENCOUNTER — TELEPHONE (OUTPATIENT)
Dept: SURGERY | Age: 59
End: 2019-06-18

## 2019-06-18 NOTE — TELEPHONE ENCOUNTER
Called pt at [phone] & LM on VM to call back. Informed that his leg discomfort should be checked. Called pt @ 2:54 pm and notified of what NP said. Gave pt fax number to fax results to office. Pt is currently in [place].

## 2019-06-18 NOTE — TELEPHONE ENCOUNTER
Pt called in to resched post-op f/u appt. He stated he is out of town for one more week. Pt asked if there is anything he should be looking for post-op since he is coming in a week later. I informed when the steri strips start to lift he can take them off. Pt also stated that he has pain on the top of his left foot and discomfort in the entire left calf. He stated this is off and on.

## 2019-06-18 NOTE — TELEPHONE ENCOUNTER
The pain in his foot and calf is concerning. He should be evaluated for a blood clot. He should go an urgent care center wherever he is to have a doppler done to rule out a DVT since he is post-op and has been traveling.      He needs to limit lifting to 10 lbs until his office follow up

## 2019-07-02 ENCOUNTER — OFFICE VISIT (OUTPATIENT)
Dept: SURGERY | Age: 59
End: 2019-07-02

## 2019-07-02 VITALS
BODY MASS INDEX: 30.49 KG/M2 | HEART RATE: 70 BPM | HEIGHT: 70 IN | TEMPERATURE: 98.2 F | SYSTOLIC BLOOD PRESSURE: 126 MMHG | WEIGHT: 213 LBS | RESPIRATION RATE: 14 BRPM | OXYGEN SATURATION: 98 % | DIASTOLIC BLOOD PRESSURE: 66 MMHG

## 2019-07-02 DIAGNOSIS — Z87.19 S/P VENTRAL HERNIORRHAPHY: ICD-10-CM

## 2019-07-02 DIAGNOSIS — Z98.890 S/P LAPAROSCOPIC HERNIA REPAIR: ICD-10-CM

## 2019-07-02 DIAGNOSIS — Z98.890 S/P VENTRAL HERNIORRHAPHY: ICD-10-CM

## 2019-07-02 DIAGNOSIS — Z87.19 S/P LAPAROSCOPIC HERNIA REPAIR: ICD-10-CM

## 2019-07-02 PROBLEM — K40.90 INGUINAL HERNIA OF LEFT SIDE WITHOUT OBSTRUCTION OR GANGRENE: Status: RESOLVED | Noted: 2019-05-24 | Resolved: 2019-07-02

## 2019-07-02 PROBLEM — K42.0 INCARCERATED UMBILICAL HERNIA: Status: RESOLVED | Noted: 2019-05-24 | Resolved: 2019-07-02

## 2019-07-02 NOTE — PROGRESS NOTES
Subjective:      Trip Metzger is a 62 y.o. middle [place] male presents for postop care 3 weeks following a hernia repair. Mr. Bernard Lubin is doing fine. His appetite is good, and he is eating a regular diet without difficulty. His bowel movements are regular. He is voiding without difficulty. He occasionally has pain along his left testicle if he sits the wrong way. He initially had pain in his right calf after a long trip, but it has resolved. He denies any leg swelling. Objective:     Visit Vitals  /66 (BP 1 Location: Left arm, BP Patient Position: Sitting)   Pulse 70   Temp 98.2 °F (36.8 °C) (Oral)   Resp 14   Ht 5' 10\" (1.778 m)   Wt 213 lb (96.6 kg)   SpO2 98%   BMI 30.56 kg/m²       General:  alert, cooperative, no distress   Abdomen:  Soft, NT, ND. The incisions are clean, dry, and intact with no erythema. There is no hernia. Left groin:  There is no hernia. Assessment:     1st POV, s/p robot lap left inguinal herniorrhaphy with mesh and open primary umbilical herniorrhaphy. Plan:     Mr. Bernard Lubin is doing fine. He will continue with light until he f/u with me in 3 weeks for his final checkup.

## 2019-07-02 NOTE — PROGRESS NOTES
1. Have you been to the ER, urgent care clinic since your last visit? Hospitalized since your last visit? No    2. Have you seen or consulted any other health care providers outside of the [address] [place] [address] since your last visit? Include any pap smears or colon screening.  No

## 2019-07-10 ENCOUNTER — OFFICE VISIT (OUTPATIENT)
Dept: FAMILY MEDICINE CLINIC | Age: 59
End: 2019-07-10

## 2019-07-10 VITALS
OXYGEN SATURATION: 96 % | BODY MASS INDEX: 31.7 KG/M2 | SYSTOLIC BLOOD PRESSURE: 117 MMHG | DIASTOLIC BLOOD PRESSURE: 68 MMHG | WEIGHT: 221.4 LBS | HEIGHT: 70 IN | HEART RATE: 80 BPM | RESPIRATION RATE: 16 BRPM | TEMPERATURE: 98.3 F

## 2019-07-10 DIAGNOSIS — R21 RASH: Primary | ICD-10-CM

## 2019-07-10 NOTE — PROGRESS NOTES
HPI       Chief Complaint: Rash     Steve Ovalle is a 62 y.o. male who presents for rash on L forearm. Started last week - after he was working outside cleaning up weeds/bushes. Thought he had initially scratched himself but reports it is now itching and burning. Does not remember being bitten or scratched by anything. No fevers, no chills, N/V, abdominal pain, diarrhea/constipation. No systemic symptoms. Reports since onset it has become itchier and more erythematous. Has not tried to put anything on it. Denies hx of similar or hx of rashes. Denies hx of skin infections. Not affecting the use of his arm. PMHx:  Past Medical History:   Diagnosis Date    Cataract     Gout     Hypercholesterolemia     Hypertension     Obesity (BMI 30.0-34. 9)      Meds:   Current Outpatient Medications   Medication Sig Dispense Refill    allopurinol (ZYLOPRIM) 300 mg tablet Take 1 Tab by mouth daily. 90 Tab 2    atorvastatin (LIPITOR) 40 mg tablet Take 1 Tab by mouth daily. 90 Tab 2    losartan (COZAAR) 100 mg tablet TAKE 1 TABLET BY MOUTH EVERY DAY 90 Tab 2     Allergies:   No Known Allergies    ROS  Review of Systems   Constitutional: Negative for chills and fever. Respiratory: Negative for cough. Gastrointestinal: Negative for nausea and vomiting. Skin: Positive for itching and rash. Neurological: Negative for dizziness and headaches. Physical Exam:  Visit Vitals  /68 (BP 1 Location: Right arm, BP Patient Position: Sitting)   Pulse 80   Temp 98.3 °F (36.8 °C) (Oral)   Resp 16   Ht 5' 10\" (1.778 m)   Wt 221 lb 6.4 oz (100.4 kg)   SpO2 96%   BMI 31.77 kg/m²      Physical Exam   Constitutional: He appears well-developed and well-nourished. Cardiovascular: Normal rate, regular rhythm and normal heart sounds. Pulmonary/Chest: Effort normal and breath sounds normal. No respiratory distress.    Skin:   Thin streak of raised bumpy erythematous rash measuring 4cm and then 1cm, approx 0.25cm in diameter. Healing vesicles present. No warmth, active drainage, or signs of acute infx. Assessment     62 y.o. male with:    ICD-10-CM ICD-9-CM    1. Rash R21 782.1               Plan     1. Rash - consistent with poison ivy  Continue conservative management  Discussed with patient to use oatmeal baths, cool wet compresses to help with itching  Keep area clean and dry  Symptoms should resolve over the next 2-3 weeks  Return if no improvement or if signs of infx develop     Patient discussed with Dr. Jam Garvey (attending physician)    I have discussed the diagnosis with the patient and the intended plan as seen in the above orders. The patient has received an after-visit summary and questions were answered concerning future plans. I have discussed medication side effects and warnings with the patient as well.     Vito Jay MD  Family Medicine Resident  PGY-3

## 2019-07-10 NOTE — PATIENT INSTRUCTIONS
Poison Rayma S[place], [place], and Sum[place]: Care Instructions  Your Care Instructions    Poison ivy, poison oak, and poison sumac are plants that can cause a skin rash upon contact. The red, itchy rash often shows up in lines or streaks and may cause fluid-filled blisters or large, raised hives. The rash is caused by an allergic reaction to an oil in poison ivy, oak, and sumac. The rash may occur when you touch the plant or when you touch clothing, pet fur, sporting gear, gardening tools, or other objects that have come in contact with one of these plants. You cannot catch or spread the rash, even if you touch it or the blister fluid, because the plant oil will already have been absorbed or washed off the skin. The rash may seem to be spreading, but either it is still developing from earlier contact or you have touched something that still has the plant oil on it. Follow-up care is a key part of your treatment and safety. Be sure to make and go to all appointments, and call your doctor if you are having problems. It's also a good idea to know your test results and keep a list of the medicines you take. How can you care for yourself at home? · If your doctor prescribed a cream, use it as directed. If your doctor prescribed medicine, take it exactly as prescribed. Call your doctor if you think you are having a problem with your medicine. · Use cold, wet cloths to reduce itching. · Keep cool, and stay out of the sun. · Leave the rash open to the air. · Wash all clothing or other things that may have come in contact with the plant oil. · Avoid most lotions and ointments until the rash heals. Calamine lotion may help relieve symptoms of a plant rash. Use it 3 or 4 times a day. To prevent poison ivy exposure  If you know that you will be near poison ivy, oak, or sumac, you can try these options:  · Use a product designed to help prevent plant oil from getting on the skin.  These products, such as Ivy X Pre-Contact Skin Solution, come in lotions, sprays, or towelettes. You put the product on your skin right before you go outdoors. · If you did not use a preventive product and you have had contact with plant oil, clean it off your skin as soon as possible. Use a product such as [employer]ecnu Original Outdoor Skin Cleanser. These products can also be used to clean plant oil from clothing or tools. When should you call for help? Call your doctor now or seek immediate medical care if:    · Your rash gets worse, and you start to feel bad and have a fever, a stiff neck, nausea, and vomiting.     · You have signs of infection, such as:  ? Increased pain, swelling, warmth, or redness. ? Red streaks leading from the rash. ? Pus draining from the rash. ? A fever.    Watch closely for changes in your health, and be sure to contact your doctor if:    · You have new blisters or bruises, or the rash spreads and looks like a sunburn.     · The rash gets worse, or it comes back after nearly disappearing.     · You think a medicine you are using is making your rash worse.     · Your rash does not clear up after 1 to 2 weeks of home treatment.     · You have joint aches or body aches with your rash. Where can you learn more? Go to http://jess-danyel.info/. Enter S593 in the search box to learn more about \"Poison [place], [place], and [place]: Care Instructions. \"  Current as of: April 17, 2018  Content Version: 11.9  © 4335-5583 [employer]. Care instructions adapted under license by [employer] (which disclaims liability or warranty for this information). If you have questions about a medical condition or this instruction, always ask your healthcare professional. [place] [address] any warranty or liability for your use of this information.

## 2019-07-10 NOTE — PROGRESS NOTES
Chief Complaint   Patient presents with    Rash     left forearm, x week, itch, burn. bumpy fluid in bumps     1. Have you been to the ER, urgent care clinic since your last visit? Hospitalized since your last visit? No    2. Have you seen or consulted any other health care providers outside of the [address] [place] [address] since your last visit? Include any pap smears or colon screening.  No  Visit Vitals  /68 (BP 1 Location: Right arm, BP Patient Position: Sitting)   Pulse 80   Temp 98.3 °F (36.8 °C) (Oral)   Resp 16   Ht 5' 10\" (1.778 m)   Wt 221 lb 6.4 oz (100.4 kg)   SpO2 96%   BMI 31.77 kg/m²     Health Maintenance Due   Topic Date Due    Shingrix Vaccine Age 50> (1 of 2) 08/15/2010    FOBT Q 1 YEAR AGE 50-75  01/29/2019

## 2019-07-23 ENCOUNTER — OFFICE VISIT (OUTPATIENT)
Dept: SURGERY | Age: 59
End: 2019-07-23

## 2019-07-23 VITALS
TEMPERATURE: 98.2 F | SYSTOLIC BLOOD PRESSURE: 125 MMHG | HEIGHT: 70 IN | HEART RATE: 68 BPM | BODY MASS INDEX: 30.97 KG/M2 | DIASTOLIC BLOOD PRESSURE: 76 MMHG | OXYGEN SATURATION: 98 % | WEIGHT: 216.31 LBS | RESPIRATION RATE: 14 BRPM

## 2019-07-23 DIAGNOSIS — Z87.19 S/P LAPAROSCOPIC HERNIA REPAIR: Primary | ICD-10-CM

## 2019-07-23 DIAGNOSIS — Z98.890 S/P LAPAROSCOPIC HERNIA REPAIR: Primary | ICD-10-CM

## 2019-07-23 NOTE — PROGRESS NOTES
Subjective:      David Foreman is a 62 y.o. male presents for postop care 6 weeks following a hernia repair. Mr. Liat Gil is doing fine. He still has occasional pain in his left testicle which most bothersome at night, but it appears to be diminishing. Objective:     Visit Vitals  /76 (BP 1 Location: Left arm, BP Patient Position: Sitting)   Pulse 68   Temp 98.2 °F (36.8 °C) (Oral)   Resp 14   Ht 5' 10\" (1.778 m)   Wt 216 lb 5 oz (98.1 kg)   SpO2 98%   BMI 31.04 kg/m²       General:  alert, cooperative, no distress   Abdomen:  Soft, NT, ND. The incisions are healed. There is no hernia. Left groin:   There is no hernia. The left testicle is slightly tender, but not swollen. Assessment:     2nd POV, s/p robot lap left inguinal herniorrhaphy with mesh and open primary umbilical herniorrhaphy. Plan:     Mr. Liat Gil is doing well except for the left testicular pain which has improved. I have offered to order an US of the scrotum to evaluated the testicle, but he defers at this time. If he has not continued to improve in the next 2 weeks or so, then he will call back to be scheduled. He is fine to gradually resume normal activity. He can f/u prn.

## 2019-07-23 NOTE — PROGRESS NOTES
1. Have you been to the ER, urgent care clinic since your last visit? Hospitalized since your last visit? No    2. Have you seen or consulted any other health care providers outside of the [address] [place] [address] since your last visit? Include any pap smears or colon screening.  No

## 2020-02-22 ENCOUNTER — OFFICE VISIT (OUTPATIENT)
Dept: FAMILY MEDICINE CLINIC | Age: 60
End: 2020-02-22

## 2020-02-22 VITALS
BODY MASS INDEX: 33.73 KG/M2 | WEIGHT: 235.6 LBS | SYSTOLIC BLOOD PRESSURE: 127 MMHG | HEIGHT: 70 IN | RESPIRATION RATE: 16 BRPM | DIASTOLIC BLOOD PRESSURE: 80 MMHG | OXYGEN SATURATION: 95 % | HEART RATE: 88 BPM | TEMPERATURE: 98.5 F

## 2020-02-22 DIAGNOSIS — J01.00 ACUTE NON-RECURRENT MAXILLARY SINUSITIS: Primary | ICD-10-CM

## 2020-02-22 DIAGNOSIS — H10.32 ACUTE BACTERIAL CONJUNCTIVITIS OF LEFT EYE: ICD-10-CM

## 2020-02-22 RX ORDER — POLYMYXIN B SULFATE AND TRIMETHOPRIM 1; 10000 MG/ML; [USP'U]/ML
1 SOLUTION OPHTHALMIC EVERY 4 HOURS
Qty: 1 BOTTLE | Refills: 0 | Status: SHIPPED | OUTPATIENT
Start: 2020-02-22 | End: 2020-03-03

## 2020-02-22 RX ORDER — AMOXICILLIN AND CLAVULANATE POTASSIUM 875; 125 MG/1; MG/1
1 TABLET, FILM COATED ORAL 2 TIMES DAILY
Qty: 20 TAB | Refills: 0 | Status: SHIPPED | OUTPATIENT
Start: 2020-02-22 | End: 2020-03-03

## 2020-02-22 NOTE — PATIENT INSTRUCTIONS
Pinkeye: Care Instructions  Your Care Instructions    Pinkeye is redness and swelling of the eye surface and the conjunctiva (the lining of the eyelid and the covering of the white part of the eye). Pinkeye is also called conjunctivitis. Pinkeye is often caused by infection with bacteria or a virus. Dry air, allergies, smoke, and chemicals are other common causes. Pinkeye often clears on its own in 7 to 10 days. Antibiotics only help if the pinkeye is caused by bacteria. Pinkeye caused by infection spreads easily. If an allergy or chemical is causing pinkeye, it will not go away unless you can avoid whatever is causing it. Follow-up care is a key part of your treatment and safety. Be sure to make and go to all appointments, and call your doctor if you are having problems. It's also a good idea to know your test results and keep a list of the medicines you take. How can you care for yourself at home? · Wash your hands often. Always wash them before and after you treat pinkeye or touch your eyes or face. · Use moist cotton or a clean, wet cloth to remove crust. Wipe from the inside corner of the eye to the outside. Use a clean part of the cloth for each wipe. · Put cold or warm wet cloths on your eye a few times a day if the eye hurts. · Do not wear contact lenses or eye makeup until the pinkeye is gone. Throw away any eye makeup you were using when you got pinkeye. Clean your contacts and storage case. If you wear disposable contacts, use a new pair when your eye has cleared and it is safe to wear contacts again. · If the doctor gave you antibiotic ointment or eyedrops, use them as directed. Use the medicine for as long as instructed, even if your eye starts looking better soon. Keep the bottle tip clean, and do not let it touch the eye area. · To put in eyedrops or ointment:  ? Tilt your head back, and pull your lower eyelid down with one finger. ?  Drop or squirt the medicine inside the lower lid.  ? Close your eye for 30 to 60 seconds to let the drops or ointment move around. ? Do not touch the ointment or dropper tip to your eyelashes or any other surface. · Do not share towels, pillows, or washcloths while you have pinkeye. When should you call for help? Call your doctor now or seek immediate medical care if:    · You have pain in your eye, not just irritation on the surface.     · You have a change in vision or loss of vision.     · You have an increase in discharge from the eye.     · Your eye has not started to improve or begins to get worse within 48 hours after you start using antibiotics.     · Pinkeye lasts longer than 7 days.    Watch closely for changes in your health, and be sure to contact your doctor if you have any problems. Where can you learn more? Go to http://jess-danyel.info/. Enter Y392 in the search box to learn more about \"Pinkeye: Care Instructions. \"  Current as of: June 26, 2019  Content Version: 12.2  © 7499-5592 [employer]. Care instructions adapted under license by [employer] (which disclaims liability or warranty for this information). If you have questions about a medical condition or this instruction, always ask your healthcare professional. [address] any warranty or liability for your use of this information. Sinusitis: Care Instructions  Your Care Instructions    Sinusitis is an infection of the lining of the sinus cavities in your head. Sinusitis often follows a cold. It causes pain and pressure in your head and face. In most cases, sinusitis gets better on its own in 1 to 2 weeks. But some mild symptoms may last for several weeks. Sometimes antibiotics are needed. Follow-up care is a key part of your treatment and safety. Be sure to make and go to all appointments, and call your doctor if you are having problems.  It's also a good idea to know your test results and keep a list of the medicines you take. How can you care for yourself at home? · Take an over-the-counter pain medicine, such as acetaminophen (Tylenol), ibuprofen (Advil, Motrin), or naproxen (Aleve). Read and follow all instructions on the label. · If the doctor prescribed antibiotics, take them as directed. Do not stop taking them just because you feel better. You need to take the full course of antibiotics. · Be careful when taking over-the-counter cold or flu medicines and Tylenol at the same time. Many of these medicines have acetaminophen, which is Tylenol. Read the labels to make sure that you are not taking more than the recommended dose. Too much acetaminophen (Tylenol) can be harmful. · Breathe warm, moist air from a steamy shower, a hot bath, or a sink filled with hot water. Avoid cold, dry air. Using a humidifier in your home may help. Follow the directions for cleaning the machine. · Use saline (saltwater) nasal washes to help keep your nasal passages open and wash out mucus and bacteria. You can buy saline nose drops at a grocery store or drugstore. Or you can make your own at home by adding 1 teaspoon of salt and 1 teaspoon of baking soda to 2 cups of distilled water. If you make your own, fill a bulb syringe with the solution, insert the tip into your nostril, and squeeze gently. Sidonie Nanette your nose. · Put a hot, wet towel or a warm gel pack on your face 3 or 4 times a day for 5 to 10 minutes each time. · Try a decongestant nasal spray like oxymetazoline (Afrin). Do not use it for more than 3 days in a row. Using it for more than 3 days can make your congestion worse. When should you call for help?   Call your doctor now or seek immediate medical care if:    · You have new or worse swelling or redness in your face or around your eyes.     · You have a new or higher fever.    Watch closely for changes in your health, and be sure to contact your doctor if:    · You have new or worse facial pain.     · The mucus from your nose becomes thicker (like pus) or has new blood in it.     · You are not getting better as expected. Where can you learn more? Go to http://jess-danyel.info/. Enter N975 in the search box to learn more about \"Sinusitis: Care Instructions. \"  Current as of: October 21, 2018  Content Version: 12.2  © 0872-3438 [employer]. Care instructions adapted under license by [employer] (which disclaims liability or warranty for this information). If you have questions about a medical condition or this instruction, always ask your healthcare professional. [place] [address] any warranty or liability for your use of this information.

## 2020-02-22 NOTE — PROGRESS NOTES
Subjective:   Becky Cannon is an 61 y.o. male who presents for possible sinusitis. HPI  Chief Complaint   Patient presents with    Sinus Infection     1 week runny nose itchy/irritated throat, mucous and discharge coming from left eye (this morning). No fever/body aches/chills. 1.Sinusitis  He started to have nasal congestion with nasal drainage for about 1 week ago. The symptoms were getting worse over the time and now he is having some pressure over the sinuses. No fever, no chills, no headaches. He has not tried any medications. 2.Eye discharge  He woke up this morning and noticed whitish discharge from the left eye associated with some crusting. No vision change. No trauma to the eye. Allergies - reviewed:   No Known Allergies      Medications - reviewed:  Current Outpatient Medications   Medication Sig    amoxicillin-clavulanate (AUGMENTIN) 875-125 mg per tablet Take 1 Tab by mouth two (2) times a day for 10 days.  trimethoprim-polymyxin b (POLYTRIM) ophthalmic solution Administer 1 Drop to both eyes every four (4) hours for 10 days.  allopurinol (ZYLOPRIM) 300 mg tablet Take 1 Tab by mouth daily.  atorvastatin (LIPITOR) 40 mg tablet Take 1 Tab by mouth daily.  losartan (COZAAR) 100 mg tablet TAKE 1 TABLET BY MOUTH EVERY DAY     No current facility-administered medications for this visit. Past Medical History - reviewed:  Past Medical History:   Diagnosis Date    Cataract     Gout     Hypercholesterolemia     Hypertension     Obesity (BMI 30.0-34. 9)          Review of Systems   CONSTITUTIONAL: denies fever. Denies chills. EYES: +left eye discharge. denies double vision. ENT:+sinus pressure with sinus congestion and  drainage  CARDIOVASCULAR: denies chest pain.  Denies palpitations  RESPIRATORY: denies shortness of breath      Objective:     Visit Vitals  /80 (BP 1 Location: Right arm, BP Patient Position: Sitting)   Pulse 88   Temp 98.5 °F (36.9 °C) (Oral)   Resp 16   Ht 5' 10\" (1.778 m)   Wt 235 lb 9.6 oz (106.9 kg)   SpO2 95%   BMI 33.81 kg/m²       General appearance - alert, well appearing, and in no distress  Eyes - pupils equal and reactive, extraocular eye movements intact. +whitish discharge from the left eye with conjunctival injection   Ears - bilateral TM's and external ear canals normal  Nose - normal and patent, no erythema, +clear nasal discharge, +minimally tenderness over the maxillary sinuses   Mouth - mucous membranes moist, pharynx normal without lesions  Neck - supple, no significant adenopathy  Chest - clear to auscultation, no wheezes, rales or rhonchi, symmetric air entry  Heart - normal rate, regular rhythm, normal S1, S2, no murmurs, rubs, clicks or gallops    Assessment:   Mirna Barrera is a 61 y.o. male who was seen today for:    ICD-10-CM ICD-9-CM    1. Acute non-recurrent maxillary sinusitis J01.00 461.0 amoxicillin-clavulanate (AUGMENTIN) 875-125 mg per tablet   2. Acute bacterial conjunctivitis of left eye H10.32 372.03 trimethoprim-polymyxin b (POLYTRIM) ophthalmic solution         Plan:   1. Acute sinusitis. Non recurrent. Will treat with Augmentin. 2. Acute bacterial conjunctivitis. No vision change. Will treat with Polytrim. I have discussed the diagnosis with the patient and the intended plan as seen in the above orders. The patient has received an after-visit summary and questions were answered concerning future plans. I have discussed medication side effects and warnings with the patient as well. Informed pt to return to the office if new symptoms arise.       Cali Webster MD  Family Medicine Physician

## 2020-02-22 NOTE — PROGRESS NOTES
Chief Complaint   Patient presents with    Sinus Infection     1 week runny nose itchy/irritated throat, mucous and discharge coming from left eye (this morning). No fever/body aches/chills. 1. Have you been to the ER, urgent care clinic since your last visit? Hospitalized since your last visit? No    2. Have you seen or consulted any other health care providers outside of the [address] [place] [address] since your last visit? Include any pap smears or colon screening.   No

## 2020-06-05 DIAGNOSIS — M10.9 GOUT, UNSPECIFIED CAUSE, UNSPECIFIED CHRONICITY, UNSPECIFIED SITE: ICD-10-CM

## 2020-06-05 DIAGNOSIS — I10 ESSENTIAL HYPERTENSION: ICD-10-CM

## 2020-06-05 DIAGNOSIS — E78.5 DYSLIPIDEMIA: ICD-10-CM

## 2020-06-08 RX ORDER — LOSARTAN POTASSIUM 100 MG/1
TABLET ORAL
Qty: 90 TAB | Refills: 0 | Status: SHIPPED | OUTPATIENT
Start: 2020-06-08 | End: 2020-08-14 | Stop reason: SDUPTHER

## 2020-06-08 RX ORDER — ALLOPURINOL 300 MG/1
TABLET ORAL
Qty: 90 TAB | Refills: 0 | Status: SHIPPED | OUTPATIENT
Start: 2020-06-08 | End: 2020-08-14 | Stop reason: SDUPTHER

## 2020-06-08 RX ORDER — ATORVASTATIN CALCIUM 40 MG/1
TABLET, FILM COATED ORAL
Qty: 90 TAB | Refills: 0 | Status: SHIPPED | OUTPATIENT
Start: 2020-06-08 | End: 2020-08-14 | Stop reason: SDUPTHER

## 2020-06-08 NOTE — TELEPHONE ENCOUNTER
Approved meds for 90 day supply. Chronic conditions last addressed > 1 year ago with Dr. Dede Iverson. Will have patient scheduled for follow up.

## 2020-07-29 ENCOUNTER — TELEPHONE (OUTPATIENT)
Dept: FAMILY MEDICINE CLINIC | Age: 60
End: 2020-07-29

## 2020-07-29 ENCOUNTER — OFFICE VISIT (OUTPATIENT)
Dept: FAMILY MEDICINE CLINIC | Age: 60
End: 2020-07-29

## 2020-07-29 VITALS
HEART RATE: 87 BPM | BODY MASS INDEX: 32.83 KG/M2 | TEMPERATURE: 98.9 F | OXYGEN SATURATION: 96 % | SYSTOLIC BLOOD PRESSURE: 133 MMHG | DIASTOLIC BLOOD PRESSURE: 89 MMHG | WEIGHT: 228.8 LBS

## 2020-07-29 DIAGNOSIS — M25.562 ACUTE PAIN OF LEFT KNEE: Primary | ICD-10-CM

## 2020-07-29 NOTE — PATIENT INSTRUCTIONS
Knee Pain or Injury: Care Instructions  Your Care Instructions     Injuries are a common cause of knee problems. Sudden (acute) injuries may be caused by a direct blow to the knee. They can also be caused by abnormal twisting, bending, or falling on the knee. Pain, bruising, or swelling may be severe, and may start within minutes of the injury. Overuse is another cause of knee pain. Other causes are climbing stairs, kneeling, and other activities that use the knee. Everyday wear and tear, especially as you get older, also can cause knee pain. Rest, along with home treatment, often relieves pain and allows your knee to heal. If you have a serious knee injury, you may need tests and treatment. Follow-up care is a key part of your treatment and safety. Be sure to make and go to all appointments, and call your doctor if you are having problems. It's also a good idea to know your test results and keep a list of the medicines you take. How can you care for yourself at home? · Be safe with medicines. Read and follow all instructions on the label. ? If the doctor gave you a prescription medicine for pain, take it as prescribed. ? If you are not taking a prescription pain medicine, ask your doctor if you can take an over-the-counter medicine. · Rest and protect your knee. Take a break from any activity that may cause pain. · Put ice or a cold pack on your knee for 10 to 20 minutes at a time. Put a thin cloth between the ice and your skin. · Prop up a sore knee on a pillow when you ice it or anytime you sit or lie down for the next 3 days. Try to keep it above the level of your heart. This will help reduce swelling. · If your knee is not swollen, you can put moist heat, a heating pad, or a warm cloth on your knee. · If your doctor recommends an elastic bandage, sleeve, or other type of support for your knee, wear it as directed.   · Follow your doctor's instructions about how much weight you can put on your leg. Use a cane, crutches, or a walker as instructed. · Follow your doctor's instructions about activity during your healing process. If you can do mild exercise, slowly increase your activity. · Reach and stay at a healthy weight. Extra weight can strain the joints, especially the knees and hips, and make the pain worse. Losing even a few pounds may help. When should you call for help? [id number] anytime you think you may need emergency care. For example, call if:  · You have symptoms of a blood clot in your lung (called a pulmonary embolism). These may include:  ? Sudden chest pain. ? Trouble breathing. ? Coughing up blood. Call your doctor now or seek immediate medical care if:  · You have severe or increasing pain. · Your leg or foot turns cold or changes color. · You cannot stand or put weight on your knee. · Your knee looks twisted or bent out of shape. · You cannot move your knee. · You have signs of infection, such as:  ? Increased pain, swelling, warmth, or redness. ? Red streaks leading from the knee. ? Pus draining from a place on your knee. ? A fever. · You have signs of a blood clot in your leg (called a deep vein thrombosis), such as:  ? Pain in your calf, back of the knee, thigh, or groin. ? Redness and swelling in your leg or groin. Watch closely for changes in your health, and be sure to contact your doctor if:  · You have tingling, weakness, or numbness in your knee. · You have any new symptoms, such as swelling. · You have bruises from a knee injury that last longer than 2 weeks. · You do not get better as expected. Where can you learn more? Go to http://www.gray.com/  Enter K195 in the search box to learn more about \"Knee Pain or Injury: Care Instructions. \"  Current as of: June 26, 2019               Content Version: 12.5  © 0048-2205 [employer].    Care instructions adapted under license by [employer] (which disclaims liability or warranty for this information). If you have questions about a medical condition or this instruction, always ask your healthcare professional. [place] [address] any warranty or liability for your use of this information.

## 2020-07-29 NOTE — PROGRESS NOTES
Patient is a 61year old male who is here today for a left knee swollen. Patient said it is swollen and tender, discomfort when he walks. Has not taken any medications for it. Chief Complaint   Patient presents with    Knee Swelling     Left knee. 3 weeks it has been swollen & tenderness and hard to walk. Discomfort. Has not taken any medications for it      Visit Vitals  /89 (BP 1 Location: Left arm, BP Patient Position: Sitting)   Pulse 87   Temp 98.9 °F (37.2 °C) (Temporal)   Wt 228 lb 12.8 oz (103.8 kg)   SpO2 96%   BMI 32.83 kg/m²     1. Have you been to the ER, urgent care clinic since your last visit? Hospitalized since your last visit? No    2. Have you seen or consulted any other health care providers outside of the [address] [place] [address] since your last visit? Include any pap smears or colon screening.  No

## 2020-07-29 NOTE — PROGRESS NOTES
HPI:  Anila Santizo is a 61 y.o. male who presents with left medial knee pain. Onset: >3 weeks ago  Mechanism of Injury (SANDRO) / Trauma: unclear  Timing of pain (general): weightbearing  Location of pain (most prominent): medial knee  Character of pain: achy    Pain severity (0 -- no pain ; 10 -- worst pain of life): 6 / 10  Associated symptoms: intermittent swelling, stifness; no  numbness, tingling, or weakness    Radiation of symptoms: none  Exacerbating Factors: walking  Alleviating Factors: ice, rest  Sx's worse (time of day): in the morning  Numbness distal to site of injury: No  Tingling distal to site of injury: No  Motor weakness distal to site of injury: No  Patient has tried: ice, rest with minimal relief of symptoms  Overall, symptoms are: getting better; % improved: 50%  Past history of similar problem: No  Sporting or Exercise Activity: swimming, jogging    Past Medical History:   Diagnosis Date    Cataract     Gout     Hypercholesterolemia     Hypertension     Obesity (BMI 30.0-34. 9)        Current Outpatient Medications:     allopurinoL (ZYLOPRIM) 300 mg tablet, TAKE 1 TABLET BY MOUTH DAILY, Disp: 90 Tab, Rfl: 0    atorvastatin (LIPITOR) 40 mg tablet, TAKE 1 TABLET BY MOUTH DAILY, Disp: 90 Tab, Rfl: 0    losartan (COZAAR) 100 mg tablet, TAKE 1 TABLET BY MOUTH EVERY DAY, Disp: 90 Tab, Rfl: 0  No Known Allergies  Past Medical History:   Diagnosis Date    Cataract     Gout     Hypercholesterolemia     Hypertension     Obesity (BMI 30.0-34. 9)      Family History   Problem Relation Age of Onset    Stroke Father        ROS: As per HPI otherwise negative. Objective:   Visit Vitals  /89 (BP 1 Location: Left arm, BP Patient Position: Sitting)   Pulse 87   Temp 98.9 °F (37.2 °C) (Temporal)   Wt 228 lb 12.8 oz (103.8 kg)   SpO2 96%   BMI 32.83 kg/m²     Gen: Well appearing. No apparent distress. Alert and oriented. Responds to all questions appropriately.   Lungs: No labored respirations. Talking in complete sentences without difficulty. Musculoskeletal:  Knee: left  Knee Effusion: mild  No redness or warmth on exam  Quadriceps atrophy: None   Popliteal (Bakers) Cyst: Negative   Patellofemoral crepitus: positive    ROM:  Flexion: 120 (130 in right)  Extension: 0   Hip IR/ER: FROM without pain    Alignment:  Foot: neutral  Patellar tracking: minimal, miguel sign positive    Dynamic Test:  Gait: aNtalgic  Assistive devices: None    Palpation:   Patella tenderness: None  Patellar tendon tenderness: None  Quad tendon tenderness: None  Medial joint line tenderness: POSITIVE  Lateral joint line tenderness: None  MCL tenderness: None  LCL tenderness: None  Medial facet tenderness: None  Lateral facet tenderness: None  Condyle tenderness: None  Tibia tubercle tenderness: None  Proximal fibula tenderness: None  Plica tenderness: None  Prepatellar bursa tenderness: None  Pes bursa tenderness: None  ITB tenderness: None    Ligament/Meniscal Exam:  Patellar Grind: Negative   Patellar apprehension (medial/lateral): Negative   Lochman: Negative, with good endpoint   Anterior Drawer: Negative   Posterior Drawer: Negative   Valgus stress: exacerbates pain but with good endpoint   Varus stress: Negative with good endpoint   Dial test: Negative   Toro: Negative   Jose sign: Negative. Strength (0-5/5):   Flexion: Left: 5/5    Right: 5/5    Extension: Left: 5/5    Right: 5/5    Hip abduction: 5/5    Hip adduction: 5/5      Neuro/Vascular : Pulses intact, no edema, and neurologically intact . Skin: No obvious rash or skin breakdown. X-ray: mild medial compartment osteoarthritis, patellofemoral joint narrowing    ASSESSMENT:    ICD-10-CM ICD-9-CM    1. Acute pain of left knee  M25.562 719.46 XR KNEE LT MIN 4 V       PLAN:    1. Home Exercise Program as per handout. 2. Ice 15 minutes, three times a day PRN and after exercise. Can alternate with heat for 15 minutes.   3. Consider turmeric, topical voltaren gel; patient defers oral NSAIDs    Medications:    1. Acetaminophen (Tylenol):  500mg 1-2 tablets every 6 hours as needed for pain.     RTC: 4-6 weeks, consider corticosteroid injection if no further improvement

## 2020-07-29 NOTE — TELEPHONE ENCOUNTER
----- Message from Max Rojo sent at 7/25/2020  1:02 PM EDT -----  Regarding: Dr. Laurel Smith  General Message/Vendor Calls    Caller's first and last name:      Reason for call:Is requesting a return call, regarding being quarantine before scheduling appt for physical    Callback required yes/no and why:  yes    Best contact number(s):  [phone]    Details to clarify the request:  Pt would like in office appt for tender and swelling lip    August Rojo

## 2020-07-31 ENCOUNTER — TELEPHONE (OUTPATIENT)
Dept: FAMILY MEDICINE CLINIC | Age: 60
End: 2020-07-31

## 2020-07-31 NOTE — TELEPHONE ENCOUNTER
----- Message from Contreras Nettles sent at 7/30/2020  6:08 PM EDT -----  Regarding: [facility] OFFICE  Appointment not available    Caller's first and last name and relationship to patient (if not the patient):      Best contact number: [phone]      Preferred date and time: within the week of 08/03/2020      Scheduled appointment date and time: N.A      Reason for appointment: CPE       Details to clarify the request:      Contreras Nettles

## 2020-08-14 ENCOUNTER — HOSPITAL ENCOUNTER (OUTPATIENT)
Dept: LAB | Age: 60
Discharge: HOME | End: 2020-08-14

## 2020-08-14 ENCOUNTER — OFFICE VISIT (OUTPATIENT)
Dept: FAMILY MEDICINE CLINIC | Age: 60
End: 2020-08-14
Payer: COMMERCIAL

## 2020-08-14 VITALS
RESPIRATION RATE: 20 BRPM | TEMPERATURE: 97.8 F | WEIGHT: 233 LBS | SYSTOLIC BLOOD PRESSURE: 119 MMHG | OXYGEN SATURATION: 96 % | BODY MASS INDEX: 33.36 KG/M2 | DIASTOLIC BLOOD PRESSURE: 78 MMHG | HEART RATE: 74 BPM | HEIGHT: 70 IN

## 2020-08-14 DIAGNOSIS — I10 ESSENTIAL HYPERTENSION: ICD-10-CM

## 2020-08-14 DIAGNOSIS — M10.9 GOUT, UNSPECIFIED CAUSE, UNSPECIFIED CHRONICITY, UNSPECIFIED SITE: ICD-10-CM

## 2020-08-14 DIAGNOSIS — E66.09 CLASS 1 OBESITY DUE TO EXCESS CALORIES WITHOUT SERIOUS COMORBIDITY WITH BODY MASS INDEX (BMI) OF 31.0 TO 31.9 IN ADULT: ICD-10-CM

## 2020-08-14 DIAGNOSIS — E78.5 DYSLIPIDEMIA: ICD-10-CM

## 2020-08-14 DIAGNOSIS — Z00.00 VISIT FOR WELL MAN HEALTH CHECK: Primary | ICD-10-CM

## 2020-08-14 DIAGNOSIS — N52.9 ERECTILE DYSFUNCTION, UNSPECIFIED ERECTILE DYSFUNCTION TYPE: ICD-10-CM

## 2020-08-14 LAB
BILIRUB UR QL STRIP: NEGATIVE
GLUCOSE UR-MCNC: NEGATIVE MG/DL
KETONES P FAST UR STRIP-MCNC: NEGATIVE MG/DL
PH UR STRIP: 6 [PH] (ref 4.6–8)
PROT UR QL STRIP: NORMAL
SP GR UR STRIP: 1.02 (ref 1–1.03)
UA UROBILINOGEN AMB POC: NORMAL (ref 0.2–1)
URINALYSIS CLARITY POC: CLEAR
URINALYSIS COLOR POC: YELLOW
URINE BLOOD POC: NORMAL
URINE LEUKOCYTES POC: NEGATIVE
URINE NITRITES POC: NEGATIVE

## 2020-08-14 PROCEDURE — 81003 URINALYSIS AUTO W/O SCOPE: CPT | Performed by: STUDENT IN AN ORGANIZED HEALTH CARE EDUCATION/TRAINING PROGRAM

## 2020-08-14 PROCEDURE — 99396 PREV VISIT EST AGE 40-64: CPT | Performed by: STUDENT IN AN ORGANIZED HEALTH CARE EDUCATION/TRAINING PROGRAM

## 2020-08-14 PROCEDURE — 99213 OFFICE O/P EST LOW 20 MIN: CPT | Performed by: STUDENT IN AN ORGANIZED HEALTH CARE EDUCATION/TRAINING PROGRAM

## 2020-08-14 RX ORDER — ALLOPURINOL 300 MG/1
TABLET ORAL
Qty: 90 TAB | Refills: 1 | Status: SHIPPED | OUTPATIENT
Start: 2020-08-14 | End: 2021-02-24

## 2020-08-14 RX ORDER — LOSARTAN POTASSIUM 100 MG/1
100 TABLET ORAL DAILY
Qty: 90 TAB | Refills: 1 | Status: SHIPPED | OUTPATIENT
Start: 2020-08-14 | End: 2021-02-24

## 2020-08-14 RX ORDER — IBUPROFEN 200 MG
TABLET ORAL
COMMUNITY
End: 2022-04-22

## 2020-08-14 RX ORDER — ATORVASTATIN CALCIUM 40 MG/1
TABLET, FILM COATED ORAL
Qty: 90 TAB | Refills: 1 | Status: SHIPPED | OUTPATIENT
Start: 2020-08-14 | End: 2021-02-24

## 2020-08-14 RX ORDER — SILDENAFIL 50 MG/1
50 TABLET, FILM COATED ORAL AS NEEDED
Qty: 30 TAB | Refills: 1 | Status: SHIPPED | OUTPATIENT
Start: 2020-08-14 | End: 2020-08-17 | Stop reason: DRUGHIGH

## 2020-08-14 NOTE — PROGRESS NOTES
Chief Complaint   Patient presents with    Well Male     Patient fasting      1. Have you been to the ER, urgent care clinic since your last visit? Hospitalized since your last visit? No    2. Have you seen or consulted any other health care providers outside of the [address] [place] [address] since your last visit? Include any pap smears or colon screening. No     Reviewed record in preparation for visit and have obtained necessary documentation.

## 2020-08-14 NOTE — PATIENT INSTRUCTIONS
Erectile Dysfunction: Care Instructions Your Care Instructions A man has erectile dysfunction (ED) when he routinely can't get or keep an erection that allows satisfactory sex. He may not be able to have an erection at any time. Or he may not be able to have one that is firm enough or lasts long enough to complete intercourse. ED is not the same as having trouble getting an erection now and then. That's common. It happens to most men at some time. ED can be caused by problems with the blood vessels, nerves, or hormones. It can be caused by diabetes, heart disease, and injuries. Nerve disorders, such as multiple sclerosis or Parkinson's disease, can also cause it. ED can also be caused by medicines, alcohol, and tobacco. Or it may be caused by depression, stress, grief, or relationship problems. Follow-up care is a key part of your treatment and safety. Be sure to make and go to all appointments, and call your doctor if you are having problems. It's also a good idea to know your test results and keep a list of the medicines you take. How can you care for yourself at home? Lifestyle · Limit alcohol. Have no more than 2 drinks a day. · Do not smoke. Smoking makes it harder for the blood vessels in the penis to relax and let blood flow in. If you need help quitting, talk to your doctor about stop-smoking programs and medicines. These can increase your chances of quitting for good. · Do not use cocaine, heroin, or other illegal drugs. · Try to reduce stress. · Give yourself time to adjust to change. Changes in your job, family, relationships, home life, and other areas can cause stress. And stress can cause erection problems. Work with your partner · Don't assume that you know what your partner likes when it comes to sex. You may be wrong. Talk about what each of you does and does not enjoy. · Make time outside of the bedroom to talk about your sex life.  If you avoid sex because you are afraid of having erection problems, your partner may worry that you are no longer interested. · If you and your partner have trouble talking about sex, see a therapist who can help you talk about it. Reading books with your partner about sexual health may also help. · Relax. Take time for more foreplay. Worrying about your erections may only make things worse. Medicines · Tell your doctor about all the medicines that you take. ? Some medicines can cause erection problems. ? Some medicines can have dangerous interactions with medicines that are prescribed for ED, including over-the-counter medicines and herbal products. · Be safe with medicines. Take your medicines exactly as prescribed. Call your doctor if you think you are having a problem with your medicine. · Talk to your doctor about trying a medicine to help you keep an erection. This could be a medicine such as Viagra, Levitra, or Cialis. If you have a heart problem, ask your doctor if these are safe for you. Do not take these medicines if you take nitroglycerin or other nitrate medicine. When should you call for help? Call your doctor now or seek immediate medical care if: 
· You took a medicine for erectile dysfunction and you have an erection that lasts longer than 3 hours. Watch closely for changes in your health, and be sure to contact your doctor if you have any problems. Where can you learn more? Go to http://jess-danyel.info/ Enter [phone] [id number] [phone] in the search box to learn more about \"Erectile Dysfunction: Care Instructions. \" Current as of: February 11, 2020               Content Version: 12.5 © 1124-7720 [employer]. Care instructions adapted under license by [employer] (which disclaims liability or warranty for this information).  If you have questions about a medical condition or this instruction, always ask your healthcare professional. [address] any warranty or liability for your use of this information.

## 2020-08-14 NOTE — PROGRESS NOTES
ROUTINE ANNUAL WELL MALE EXAM  Subjective   Whit Gordon is a 61 y.o. male who presents to clinic today for annual physical exam.    He would also like to address the following issues:      HTN:  - Losartan 100 mg  - Risk Factors: Obesity, HLD ()               - Does not smoke or drink etoh, no DM  - Denies headaches, visual changes, CP, SOB, LE edema    Gout:  - Asymptomatic  - Allopurinol 300 mg    HLD:  - Last Lipid Panel on 4/2019 was remarkable for LDL of 104  - Lipitor 40 mg daily    Obesity:  - BMI 33.43  - Reports poor diet, eats everything, and poor exercise    Erectile Dysfunction:  - Reports softer erection over past few years and sometimes has trouble maintaining erections  - Denies dysuria, polyuria, perineal pain, denies hx of prostate issues    Sexually active?: Yes, wife  Method of protection: No    Diet: poor  Exercise: poor    Preventative care (USPSTF):    HIV: tested in past, negative  Tdap: Next due 2024    Colon cancer screening: last year wnl  Hep C screening: Negative 2015    Never smoked: No indication for AAA or low dose CT      Past Medical History  Past Medical History:   Diagnosis Date    Cataract     Gout     Hypercholesterolemia     Hypertension     Obesity (BMI 30.0-34. 9)        Allergies  No Known Allergies    Current Medications  Current Outpatient Medications on File Prior to Visit   Medication Sig Dispense Refill    ibuprofen (Motrin IB) 200 mg tablet Take  by mouth.  [DISCONTINUED] allopurinoL (ZYLOPRIM) 300 mg tablet TAKE 1 TABLET BY MOUTH DAILY 90 Tab 0    [DISCONTINUED] atorvastatin (LIPITOR) 40 mg tablet TAKE 1 TABLET BY MOUTH DAILY 90 Tab 0    [DISCONTINUED] losartan (COZAAR) 100 mg tablet TAKE 1 TABLET BY MOUTH EVERY DAY 90 Tab 0     No current facility-administered medications on file prior to visit.         Surgical History  Past Surgical History:   Procedure Laterality Date    HX APPENDECTOMY      HX CATARACT REMOVAL Bilateral     HX COLONOSCOPY  HX HERNIA REPAIR Right     Right inguinal herniorrhaphy with mesh (As child).  HX HERNIA REPAIR Left 06/06/2019    Robotic-assisted laparoscopic left inguinal herniorrhaphy with mesh and open primary repair of incarcerated umbilical hernia. Social History  Social Hx:    Smoker? Never       Alcohol Use? none       Drug Use? 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 None      Family History  Family History   Problem Relation Age of Onset    Stroke Father      Objective   Vital Signs  Visit Vitals  /78 (BP 1 Location: Right arm, BP Patient Position: Sitting)   Pulse 74   Temp 97.8 °F (36.6 °C) (Temporal)   Resp 20   Ht 5' 10\" (1.778 m)   Wt 233 lb (105.7 kg)   SpO2 96%   BMI 33.43 kg/m²       Body mass index is 33.43 kg/m². Physical Examination  GEN: No apparent distress. Alert and oriented and responds to all questions appropriately. EYES:  Conjunctiva clear; pupils round and reactive to light; extraocular movements areintact. EAR: External ears are normal.  Tympanic membranes are clear and without effusion. NOSE: Turbinates are within normal limits. No drainage  OROPHYARYNX: No oral lesions or exudates. NECK:  Supple; no masses; thyroid normal           LUNGS: Respirations unlabored; clear to auscultation bilaterally  CARDIOVASCULAR: Regular, rate, and rhythm without murmurs, gallops or rubs   ABDOMEN: Soft; nontender; nondistended; normoactive bowel sounds; no masses or organomegaly  NEUROLOGIC:  No focal neurologic deficits. Strength and sensation grossly intact. Coordination and gait grossly intact. EXT: Well perfused. No edema. SKIN: No obvious rashes. Malcolm Boles is a 61 y.o. male presenting to clinic today for routine annual exam.  Plan   There are no diagnoses linked to this encounter.  - f/u labs  - Counseled re: diet, exercise, healthy lifestyle  - Return for yearly wellness visits    1.  Visit for well man health check  - Doing well with no concerns  - METABOLIC PANEL, COMPREHENSIVE; Future  - HEMOGLOBIN A1C WITH EAG; Future  - CBC W/O DIFF; Future    2. Gout, unspecified cause, unspecified chronicity, unspecified site  - Asymptomatic  - Continue allopurinol, refill given  - URIC ACID; Future  - allopurinoL (ZYLOPRIM) 300 mg tablet; TAKE 1 TABLET BY MOUTH DAILY  Dispense: 90 Tab; Refill: 1    3. Dyslipidemia  - Last Lipid Panel on 4/2019 was remarkable for LDL of 104  - Continue Lipitor, refill given  - LIPID PANEL; Future  - atorvastatin (LIPITOR) 40 mg tablet; TAKE 1 TABLET BY MOUTH DAILY  Dispense: 90 Tab; Refill: 1    4. Essential hypertension  - Good control with losartan  - Refill given  - Counseled on DASH diet, exercise, and weight loss  - Will calculate current ASCVD score with new labs  - METABOLIC PANEL, COMPREHENSIVE; Future  - URINALYSIS W/ RFLX MICROSCOPIC; Future  - losartan (COZAAR) 100 mg tablet; Take 1 Tab by mouth daily. Dispense: 90 Tab; Refill: 1    5. Class 1 obesity due to excess calories without serious comorbidity with body mass index (BMI) of 31.0 to 31.9 in adult  - Counseled on diet, exercise, and weight loss  - Declined referral to nutrition  - HEMOGLOBIN A1C WITH EAG; Future    6. Erectile dysfunction, unspecified erectile dysfunction type  - Has had past several years, recently with softer erections and difficulties maintaining  - sildenafil citrate (VIAGRA) 50 mg tablet; Take 1 Tab by mouth as needed for Erectile Dysfunction (Can use 1/2 tablet if experiencing side effects). Dispense: 30 Tab;  Refill: 1  - Will schedule follow up telemed visit    Patient seen and discussed with Dr. Emmanuel Collazo By:  Matilde Dixon MD    Family Medicine Resident

## 2020-08-15 LAB
ALBUMIN SERPL-MCNC: 3.8 G/DL (ref 3.5–5)
ALBUMIN/GLOB SERPL: 1 {RATIO} (ref 1.1–2.2)
ALP SERPL-CCNC: 101 U/L (ref 45–117)
ALT SERPL-CCNC: 24 U/L (ref 12–78)
ANION GAP SERPL CALC-SCNC: 5 MMOL/L (ref 5–15)
AST SERPL-CCNC: 15 U/L (ref 15–37)
BILIRUB SERPL-MCNC: 0.9 MG/DL (ref 0.2–1)
BUN SERPL-MCNC: 19 MG/DL (ref 6–20)
BUN/CREAT SERPL: 14 (ref 12–20)
CALCIUM SERPL-MCNC: 9.2 MG/DL (ref 8.5–10.1)
CHLORIDE SERPL-SCNC: 106 MMOL/L (ref 97–108)
CHOLEST SERPL-MCNC: 168 MG/DL
CO2 SERPL-SCNC: 29 MMOL/L (ref 21–32)
COMMENT, HOLDF: NORMAL
CREAT SERPL-MCNC: 1.37 MG/DL (ref 0.7–1.3)
ERYTHROCYTE [DISTWIDTH] IN BLOOD BY AUTOMATED COUNT: 13.8 % (ref 11.5–14.5)
EST. AVERAGE GLUCOSE BLD GHB EST-MCNC: 117 MG/DL
GLOBULIN SER CALC-MCNC: 3.7 G/DL (ref 2–4)
GLUCOSE SERPL-MCNC: 86 MG/DL (ref 65–100)
HBA1C MFR BLD: 5.7 % (ref 4–5.6)
HCT VFR BLD AUTO: 49.5 % (ref 36.6–50.3)
HDLC SERPL-MCNC: 45 MG/DL
HDLC SERPL: 3.7 {RATIO} (ref 0–5)
HGB BLD-MCNC: 14.9 G/DL (ref 12.1–17)
LDLC SERPL CALC-MCNC: 91 MG/DL (ref 0–100)
LIPID PROFILE,FLP: ABNORMAL
MCH RBC QN AUTO: 26.9 PG (ref 26–34)
MCHC RBC AUTO-ENTMCNC: 30.1 G/DL (ref 30–36.5)
MCV RBC AUTO: 89.5 FL (ref 80–99)
NRBC # BLD: 0 K/UL (ref 0–0.01)
NRBC BLD-RTO: 0 PER 100 WBC
PLATELET # BLD AUTO: 213 K/UL (ref 150–400)
PMV BLD AUTO: 11.2 FL (ref 8.9–12.9)
POTASSIUM SERPL-SCNC: 5.6 MMOL/L (ref 3.5–5.1)
PROT SERPL-MCNC: 7.5 G/DL (ref 6.4–8.2)
RBC # BLD AUTO: 5.53 M/UL (ref 4.1–5.7)
SAMPLES BEING HELD,HOLD: NORMAL
SODIUM SERPL-SCNC: 140 MMOL/L (ref 136–145)
TRIGL SERPL-MCNC: 160 MG/DL (ref ?–150)
URATE SERPL-MCNC: 4.7 MG/DL (ref 3.5–7.2)
VLDLC SERPL CALC-MCNC: 32 MG/DL
WBC # BLD AUTO: 6.2 K/UL (ref 4.1–11.1)

## 2020-08-17 ENCOUNTER — TELEPHONE (OUTPATIENT)
Dept: FAMILY MEDICINE CLINIC | Age: 60
End: 2020-08-17

## 2020-08-17 DIAGNOSIS — R80.9 PROTEINURIA, UNSPECIFIED TYPE: ICD-10-CM

## 2020-08-17 DIAGNOSIS — N52.9 ERECTILE DYSFUNCTION, UNSPECIFIED ERECTILE DYSFUNCTION TYPE: Primary | ICD-10-CM

## 2020-08-17 RX ORDER — SILDENAFIL 25 MG/1
25 TABLET, FILM COATED ORAL AS NEEDED
Qty: 30 TAB | Refills: 1 | Status: SHIPPED | OUTPATIENT
Start: 2020-08-17 | End: 2021-03-29

## 2020-08-17 NOTE — TELEPHONE ENCOUNTER
Called and discussed results with patient. As far as A1c and lipids, now in prediabetes at 5.7 with slight elevation in TG's, counseled on low carb diet and exercise. CMP with hyperkalemia (stable) at 5.6 and slight increase in baseline Cr to 1.37, GFR 53 in setting of chronic HTN, Gout, and microalbuminuria. UA showed 2+ protein and blood. Will repeat UA w/ microscopy and add microalbumin/Cr ratio w/ referral to nephrology. Concern for nephrotic/nephritic syndrome, may be related to Losartan at max dose. Will consider reducing and adding another agent such as amlodipine upon new lab results.     Patient unable to afford viagra, insurance did not cover, will change prescription that will be covered by [employer]

## 2020-08-18 ENCOUNTER — TELEPHONE (OUTPATIENT)
Dept: FAMILY MEDICINE CLINIC | Age: 60
End: 2020-08-18

## 2020-08-18 NOTE — TELEPHONE ENCOUNTER
----- Message from Matheus Chris MD sent at 8/17/2020  4:55 PM EDT -----  Regarding: Lab appt  Please schedule lab appointment for this patient as soon as possible.     Thank you,  Mitul Lopez

## 2020-08-19 ENCOUNTER — LAB ONLY (OUTPATIENT)
Dept: FAMILY MEDICINE CLINIC | Age: 60
End: 2020-08-19

## 2020-08-19 DIAGNOSIS — R80.9 PROTEINURIA, UNSPECIFIED TYPE: ICD-10-CM

## 2020-08-19 LAB
ALBUMIN UR QL STRIP: 150 MG/L
BILIRUB UR QL STRIP: NEGATIVE
CREATININE, URINE POC: 200 MG/DL
GLUCOSE UR-MCNC: NEGATIVE MG/DL
KETONES P FAST UR STRIP-MCNC: NEGATIVE MG/DL
MICROALBUMIN/CREAT RATIO POC: NORMAL MG/G
PH UR STRIP: 5.5 [PH] (ref 4.6–8)
PROT UR QL STRIP: NORMAL
SP GR UR STRIP: 1.02 (ref 1–1.03)
UA UROBILINOGEN AMB POC: NORMAL (ref 0.2–1)
URINALYSIS CLARITY POC: CLEAR
URINALYSIS COLOR POC: YELLOW
URINE BLOOD POC: NORMAL
URINE LEUKOCYTES POC: NEGATIVE
URINE NITRITES POC: NEGATIVE

## 2020-08-19 PROCEDURE — 81001 URINALYSIS AUTO W/SCOPE: CPT | Performed by: STUDENT IN AN ORGANIZED HEALTH CARE EDUCATION/TRAINING PROGRAM

## 2020-08-19 PROCEDURE — 82044 UR ALBUMIN SEMIQUANTITATIVE: CPT | Performed by: STUDENT IN AN ORGANIZED HEALTH CARE EDUCATION/TRAINING PROGRAM

## 2020-08-21 NOTE — PROGRESS NOTES
Ordered UA w/ Micro, unfortunately microscopy was not performed. Patient Already referred to Dr. Mayelin Boss, called patient and he has not yet heard from Dr. Yasmin Fried office. Gave patient  Name and office number.

## 2021-02-23 DIAGNOSIS — E78.5 DYSLIPIDEMIA: ICD-10-CM

## 2021-02-23 DIAGNOSIS — I10 ESSENTIAL HYPERTENSION: ICD-10-CM

## 2021-02-23 DIAGNOSIS — M10.9 GOUT, UNSPECIFIED CAUSE, UNSPECIFIED CHRONICITY, UNSPECIFIED SITE: ICD-10-CM

## 2021-02-24 RX ORDER — LOSARTAN POTASSIUM 100 MG/1
TABLET ORAL
Qty: 90 TAB | Refills: 1 | Status: SHIPPED | OUTPATIENT
Start: 2021-02-24 | End: 2021-09-16

## 2021-02-24 RX ORDER — ATORVASTATIN CALCIUM 40 MG/1
TABLET, FILM COATED ORAL
Qty: 90 TAB | Refills: 1 | Status: SHIPPED | OUTPATIENT
Start: 2021-02-24 | End: 2021-09-16

## 2021-02-24 RX ORDER — ALLOPURINOL 300 MG/1
TABLET ORAL
Qty: 90 TAB | Refills: 1 | Status: SHIPPED | OUTPATIENT
Start: 2021-02-24 | End: 2021-09-16

## 2021-03-29 ENCOUNTER — VIRTUAL VISIT (OUTPATIENT)
Dept: FAMILY MEDICINE CLINIC | Age: 61
End: 2021-03-29
Payer: COMMERCIAL

## 2021-03-29 DIAGNOSIS — N52.9 ERECTILE DYSFUNCTION, UNSPECIFIED ERECTILE DYSFUNCTION TYPE: ICD-10-CM

## 2021-03-29 DIAGNOSIS — I10 ESSENTIAL HYPERTENSION: Primary | ICD-10-CM

## 2021-03-29 DIAGNOSIS — E66.9 OBESITY (BMI 30.0-34.9): ICD-10-CM

## 2021-03-29 PROCEDURE — 99214 OFFICE O/P EST MOD 30 MIN: CPT | Performed by: STUDENT IN AN ORGANIZED HEALTH CARE EDUCATION/TRAINING PROGRAM

## 2021-03-29 RX ORDER — SILDENAFIL 50 MG/1
50 TABLET, FILM COATED ORAL AS NEEDED
Qty: 30 TAB | Refills: 1 | Status: SHIPPED | OUTPATIENT
Start: 2021-03-29 | End: 2022-04-22

## 2021-03-29 NOTE — PROGRESS NOTES
Rivka Crigler  61 y.o. male  1960  [place] [address] [phone] [address]:    Telemedicine Progress Note  Francheska Vences MD       Encounter Date and Time: March 29, 2021 at 8:19 AM    Consent:  He and/or the health care decision maker is aware that that he may receive a bill for this telephone service, depending on his insurance coverage, and has provided verbal consent to proceed: Yes    Chief Complaint   Patient presents with    Hypertension     History of Present Illness   Rivka Crigler is a 61 y.o. male was evaluated by synchronous (real-time) audio-video technology from home, through [employer]. HTN:  Compliant w/ Losartan 100 mg daily  Tolerating medication well, has not noticed any side effects.  (8/2020) CMP w/ K of 5.6, Cr 1.37  Home BP - Not measuring  DASH Diet - Needs improvement  NSAID use - Not regularly, as needed for gout  Exercise - No  Smoking - No  Denies any headache, visual changes, chest pain, palpitations, shortness of breath. ED  - Reports Viagra 25 mg not very effective, would like to try increased dosage  - No reported SE's    Obesity  - Gained 25 lbs during COVID  - Unhealthy eating and no exercise        History   Patients past medical, surgical and family histories were reviewed and updated. Past Medical History:   Diagnosis Date    Cataract     Gout     Hypercholesterolemia     Hypertension     Obesity (BMI 30.0-34. 9)      Past Surgical History:   Procedure Laterality Date    HX APPENDECTOMY      HX CATARACT REMOVAL Bilateral     HX COLONOSCOPY      HX HERNIA REPAIR Right     Right inguinal herniorrhaphy with mesh (As child).  HX HERNIA REPAIR Left 06/06/2019    Robotic-assisted laparoscopic left inguinal herniorrhaphy with mesh and open primary repair of incarcerated umbilical hernia.      Family History   Problem Relation Age of Onset    Stroke Father      Social History     Socioeconomic History    Marital status:      Spouse name: Not on file    Number of children: Not on file    Years of education: Not on file    Highest education level: Not on file   Occupational History    Not on file   Social Needs    Financial resource strain: Not on file    Food insecurity     Worry: Not on file     Inability: Not on file    Transportation needs     Medical: Not on file     Non-medical: Not on file   Tobacco Use    Smoking status: Never Smoker    Smokeless tobacco: Never Used   Substance and Sexual Activity    Alcohol use: No    Drug use: No    Sexual activity: Not on file   Lifestyle    Physical activity     Days per week: Not on file     Minutes per session: Not on file    Stress: Not on file   Relationships    Social connections     Talks on phone: Not on file     Gets together: Not on file     Attends Jain service: Not on file     Active member of club or organization: Not on file     Attends meetings of clubs or organizations: Not on file     Relationship status: Not on file    Intimate partner violence     Fear of current or ex partner: Not on file     Emotionally abused: Not on file     Physically abused: Not on file     Forced sexual activity: Not on file   Other Topics Concern    Not on file   Social History Narrative    Not on file     Patient Active Problem List   Diagnosis Code    FH: CAD (coronary artery disease) Z82.49    HTN (hypertension) I10    Gout M10.9    Cataract H26.9    History of normal resting EKG [id number]    S/P colonoscopy Z98.890    Microalbuminuria R80.9    Hyperglycemia R73.9    Rheumatoid factor positive R76.8    Lipid disorder E78.9    S/P laparoscopic hernia repair Z98.890, Z87.19    S/P ventral herniorrhaphy Z98.890, Z87.19    Obesity (BMI 30.0-34. 9) E66.9    Erectile dysfunction N52.9       Current Medications/Allergies   Medications and Allergies reviewed:    Current Outpatient Medications   Medication Sig Dispense Refill    sildenafil citrate (Viagra) 50 mg tablet Take 1 Tab by mouth as needed for Erectile Dysfunction. 30 Tab 1    allopurinoL (ZYLOPRIM) 300 mg tablet TAKE 1 TABLET BY MOUTH DAILY 90 Tab 1    atorvastatin (LIPITOR) 40 mg tablet TAKE 1 TABLET BY MOUTH DAILY 90 Tab 1    losartan (COZAAR) 100 mg tablet TAKE 1 TABLET BY MOUTH DAILY 90 Tab 1    ibuprofen (Motrin IB) 200 mg tablet Take  by mouth. No Known Allergies    Review of Systems   Review of Systems   Constitutional: Negative for diaphoresis. Eyes: Negative for blurred vision. Respiratory: Negative for cough and shortness of breath. Cardiovascular: Negative for chest pain, palpitations, orthopnea and leg swelling. Gastrointestinal: Negative for abdominal pain, constipation, diarrhea, heartburn, nausea and vomiting. Neurological: Negative for dizziness and headaches. All other systems reviewed and are negative. Vitals/Objective:     General: alert, cooperative, no distress   Mental  status: mental status: alert, oriented to person, place, and time, normal mood, behavior, speech, dress, motor activity, and thought processes   Resp: resp: normal effort and no respiratory distress   Neuro: neuro: no gross deficits   Skin: skin: no discoloration or lesions of concern on visible areas   Due to this being a TeleHealth evaluation, many elements of the physical examination are unable to be assessed. Assessment and Plan:   I have reviewed the following:  - Encounter Notes from 2020, Labs from 2020. Diagnoses and all orders for this visit:    1. Essential hypertension: HyperK on last CMP (8/2020) and decrease in GFR from previous which may indicate new onset CKD. Will repeat BMP and if HyperK still present switch to combo med Losartan-HCTZ to bring K into normal range. Will repeat UA d/t protein and blood and increasing GFR on previous labs. Has established care with Nephro but never followed up.  If UA continues w/ blood/protein will also refer to Urology to r/o bladder/prostate etiology and get PSA. Patient to keep BP log for next visit. - Counseled on DASH diet, exercise, caffeine intake, and weight loss  - Avoid NSAIDs such as ibuprofen, advil, motrin, aleve, and naproxen   - Avoid OTC decongestants such as pseudoephedrine and Afrin  - Encouraged to keep Daily BP log  - Ordered BMP  - Pt will hold off on Nephro until test results  - F/u in 2 weeks    2. Erectile dysfunction, unspecified erectile dysfunction type: Poor response to viagra 25 will try 50, can increase to 100 but will consider Uro consult if continues with no response. -     sildenafil citrate (Viagra) 50 mg tablet; Take 1 Tab by mouth as needed for Erectile Dysfunction. 3. Obesity (BMI 30.0-34.9)  - Counseled on diet, exercise, and weight loss  - 2-4 lbs weight loss per month  - Initial target weight 210 lbs  - Intermittent fasting, no food past 7PM, smaller meals, low carbs, increase fiber  - Weekly weight log  - Consider referral to nutrition if not achieving goals         Patient informed to follow up: Follow-up and Dispositions  ·   Return in about 2 weeks (around 4/12/2021) for HTN. Follow-up and Disposition History          We discussed the expected course, resolution and complications of the diagnosis(es) in detail. Medication risks, benefits, costs, interactions, and alternatives were discussed as indicated. I advised him to contact the office if his condition worsens, changes or fails to improve as anticipated. He expressed understanding with the diagnosis(es) and plan. Patient understands that this encounter was a temporary measure, and the importance of further follow up and examination was emphasized. Patient verbalized understanding.        Electronically Signed: Jon Iverson MD    Providers location when delivering service: Home    Pursuant to the emergency declaration under the [facility] waiver authority and the Coronavirus Preparedness and Response Supplemental Appropriations Act, this Virtual  Visit was conducted, with patient's consent, to reduce the patient's risk of exposure to COVID-19 and provide continuity of care for an established patient. Services were provided through a video synchronous discussion virtually to substitute for in-person clinic visit.

## 2021-03-29 NOTE — PROGRESS NOTES
[address] Medicine Residency Attending Addendum:  Dr. Mitch Young MD,  the patient and I were not physically present during this encounter. The resident and I are concurrently monitoring the patient care through appropriate telecommunication technology. I discussed the findings, assessment and plan with the resident and agree with the resident's findings and plan as documented in the resident's note. Will also follow-up Urinalysis given hx of persistent urine protein and microscopic blood. In setting of worsening renal function, ?glomerulonephritis.     Minnie Berger MD

## 2021-03-31 ENCOUNTER — TELEPHONE (OUTPATIENT)
Dept: FAMILY MEDICINE CLINIC | Age: 61
End: 2021-03-31

## 2021-04-01 ENCOUNTER — LAB ONLY (OUTPATIENT)
Dept: FAMILY MEDICINE CLINIC | Age: 61
End: 2021-04-01

## 2021-04-01 DIAGNOSIS — I10 ESSENTIAL HYPERTENSION: ICD-10-CM

## 2021-04-01 LAB
APPEARANCE UR: CLEAR
BACTERIA URNS QL MICRO: NEGATIVE /HPF
BILIRUB UR QL: NEGATIVE
COLOR UR: ABNORMAL
EPITH CASTS URNS QL MICRO: ABNORMAL /LPF
GLUCOSE UR STRIP.AUTO-MCNC: NEGATIVE MG/DL
HGB UR QL STRIP: ABNORMAL
HYALINE CASTS URNS QL MICRO: ABNORMAL /LPF (ref 0–5)
KETONES UR QL STRIP.AUTO: NEGATIVE MG/DL
LEUKOCYTE ESTERASE UR QL STRIP.AUTO: NEGATIVE
NITRITE UR QL STRIP.AUTO: NEGATIVE
PH UR STRIP: 5 [PH] (ref 5–8)
PROT UR STRIP-MCNC: 30 MG/DL
RBC #/AREA URNS HPF: ABNORMAL /HPF (ref 0–5)
SP GR UR REFRACTOMETRY: 1.01 (ref 1–1.03)
UROBILINOGEN UR QL STRIP.AUTO: 0.2 EU/DL (ref 0.2–1)
WBC URNS QL MICRO: ABNORMAL /HPF (ref 0–4)

## 2021-04-02 LAB
ANION GAP SERPL CALC-SCNC: 3 MMOL/L (ref 5–15)
BUN SERPL-MCNC: 17 MG/DL (ref 6–20)
BUN/CREAT SERPL: 13 (ref 12–20)
CALCIUM SERPL-MCNC: 8.7 MG/DL (ref 8.5–10.1)
CHLORIDE SERPL-SCNC: 107 MMOL/L (ref 97–108)
CO2 SERPL-SCNC: 29 MMOL/L (ref 21–32)
CREAT SERPL-MCNC: 1.26 MG/DL (ref 0.7–1.3)
GLUCOSE SERPL-MCNC: 94 MG/DL (ref 65–100)
POTASSIUM SERPL-SCNC: 4.6 MMOL/L (ref 3.5–5.1)
SODIUM SERPL-SCNC: 139 MMOL/L (ref 136–145)

## 2021-04-12 ENCOUNTER — VIRTUAL VISIT (OUTPATIENT)
Dept: FAMILY MEDICINE CLINIC | Age: 61
End: 2021-04-12
Payer: COMMERCIAL

## 2021-04-12 DIAGNOSIS — I10 ESSENTIAL HYPERTENSION: ICD-10-CM

## 2021-04-12 DIAGNOSIS — R31.21 ASYMPTOMATIC MICROSCOPIC HEMATURIA: Primary | ICD-10-CM

## 2021-04-12 DIAGNOSIS — R80.9 MICROALBUMINURIA: ICD-10-CM

## 2021-04-12 PROCEDURE — 99214 OFFICE O/P EST MOD 30 MIN: CPT | Performed by: STUDENT IN AN ORGANIZED HEALTH CARE EDUCATION/TRAINING PROGRAM

## 2021-04-12 NOTE — PATIENT INSTRUCTIONS
Please call to schedule your appointment with provider/location then call our office back @ #[phone] to leave a message for our referral coordinator with the appointment information (date/time/providers full name) for whom you will be seeing for this referral order so that we can authorize your visit(s) with your insurance if needed and referral tracking purposes of this order.

## 2021-04-12 NOTE — PROGRESS NOTES
Bossman Knight  61 y.o. male  1960  [id number]  [address]:    Telemedicine Progress Note  Dagmar Booth MD       Encounter Date and Time: April 12, 2021 at 2:26 PM    Consent:  He and/or the health care decision maker is aware that that he may receive a bill for this telephone service, depending on his insurance coverage, and has provided verbal consent to proceed: Yes    Chief Complaint   Patient presents with    Blood in Urine     History of Present Illness   Bossman Knight is a 61 y.o. male was evaluated by synchronous (real-time) audio-video technology from home, through [employer]. Patient seen today for follow up on proteinuria/hematuria. PMH significant for HTN, ED, and gout. Has been seen by Urology in the past for hematuria/proteinuria back in 2018. CT imaging at that time showed normal kidneys and partially distended bladder w/o mass or calculus. Lost to follow up afterwards. This seems to be chronic over the past 2 years. Patient is asymptomatic with normal Hgb, denies any urinary issues or gross hematuria. He is having issues with erectile dysfunction recently. Patient reports good contol of gout and has remained asymptomatic. Patient with recent decline in GFR indicative of new CKD stage 3. He was asked to keep BP log to determine if HTN was controlled, however he has been very busy and unable to measure routinely. He agreed to reaching out to his nephrologist and agreed to see Urology for further workup. We will follow up on his HTN again in May and he agreed to bring BP log. Patient denies dizziness/lightheadedness, CP, SOB, hematuria/dysuria/hesitancy or increased frequency. Denies pelvic/back. abominal pain or fever/chills. History   Patients past medical, surgical and family histories were reviewed and updated.       Past Medical History:   Diagnosis Date    Cataract     Gout     Hypercholesterolemia  Hypertension     Obesity (BMI 30.0-34. 9)      Past Surgical History:   Procedure Laterality Date    HX APPENDECTOMY      HX CATARACT REMOVAL Bilateral     HX COLONOSCOPY      HX HERNIA REPAIR Right     Right inguinal herniorrhaphy with mesh (As child).  HX HERNIA REPAIR Left 06/06/2019    Robotic-assisted laparoscopic left inguinal herniorrhaphy with mesh and open primary repair of incarcerated umbilical hernia.      Family History   Problem Relation Age of Onset    Stroke Father      Social History     Socioeconomic History    Marital status:      Spouse name: Not on file    Number of children: Not on file    Years of education: Not on file    Highest education level: Not on file   Occupational History    Not on file   Social Needs    Financial resource strain: Not on file    Food insecurity     Worry: Not on file     Inability: Not on file    Transportation needs     Medical: Not on file     Non-medical: Not on file   Tobacco Use    Smoking status: Never Smoker    Smokeless tobacco: Never Used   Substance and Sexual Activity    Alcohol use: No    Drug use: No    Sexual activity: Not on file   Lifestyle    Physical activity     Days per week: Not on file     Minutes per session: Not on file    Stress: Not on file   Relationships    Social connections     Talks on phone: Not on file     Gets together: Not on file     Attends Bahai service: Not on file     Active member of club or organization: Not on file     Attends meetings of clubs or organizations: Not on file     Relationship status: Not on file    Intimate partner violence     Fear of current or ex partner: Not on file     Emotionally abused: Not on file     Physically abused: Not on file     Forced sexual activity: Not on file   Other Topics Concern    Not on file   Social History Narrative    Not on file     Patient Active Problem List   Diagnosis Code    FH: CAD (coronary artery disease) Z82.49    HTN (hypertension) I10    Gout M10.9    Cataract H26.9    History of normal resting EKG [id number]    S/P colonoscopy Z98.890    Microalbuminuria R80.9    Hyperglycemia R73.9    Rheumatoid factor positive R76.8    Lipid disorder E78.9    S/P laparoscopic hernia repair Z98.890, Z87.19    S/P ventral herniorrhaphy Z98.890, Z87.19    Obesity (BMI 30.0-34. 9) E66.9    Erectile dysfunction N52.9    Asymptomatic microscopic hematuria R31.21       Current Medications/Allergies   Medications and Allergies reviewed:    Current Outpatient Medications   Medication Sig Dispense Refill    sildenafil citrate (Viagra) 50 mg tablet Take 1 Tab by mouth as needed for Erectile Dysfunction. 30 Tab 1    allopurinoL (ZYLOPRIM) 300 mg tablet TAKE 1 TABLET BY MOUTH DAILY 90 Tab 1    atorvastatin (LIPITOR) 40 mg tablet TAKE 1 TABLET BY MOUTH DAILY 90 Tab 1    losartan (COZAAR) 100 mg tablet TAKE 1 TABLET BY MOUTH DAILY 90 Tab 1    ibuprofen (Motrin IB) 200 mg tablet Take  by mouth. No Known Allergies    Review of Systems   Review of Systems   Constitutional: Negative for chills and fever. HENT: Negative for congestion, sinus pain and sore throat. Respiratory: Negative for cough and shortness of breath. Cardiovascular: Negative for chest pain and palpitations. Gastrointestinal: Negative for abdominal pain, constipation, diarrhea, nausea and vomiting. Genitourinary: Negative for dysuria. Musculoskeletal: Negative for back pain. Skin: Negative for rash. Neurological: Negative for dizziness. All other systems reviewed and are negative.       Vitals/Objective:     General: alert, cooperative, no distress   Mental  status: mental status: alert, oriented to person, place, and time, normal mood, behavior, speech, dress, motor activity, and thought processes   Resp: resp: normal effort and no respiratory distress   Neuro: neuro: no gross deficits   Skin: skin: no discoloration or lesions of concern on visible areas Due to this being a TeleHealth evaluation, many elements of the physical examination are unable to be assessed. Assessment and Plan:     I have reviewed the following:  - Encounter Notes from 2021 to 2018, Labs from [id number], Imaging from CT A/P 2018      Diagnoses and all orders for this visit:    1. Asymptomatic microscopic hematuria w/ Microalbuminuria: Unclear etiology, nephritis vs inflam vs obstructive (BPH) vs uncontrolled HTN vs malignant vs bladder mass/cystitis or mixed picture. Assessment & Plan:  Chronic since 2018, no gross hematuria, asymptomatic  Unsure if good control of HTN  (2018) CT w/ unremarkable kindeys and bladder was partially distended w/o mass or calculus. Related to BPH?  (8/2020) Hgb wnl  (8/2020) Uric Acid 4.7, gout stable  (3/2021) GFR 58, Cr. 1.26  Patient to follow up with his nephrologist  BP log and HTN follow up in 2-3 weeks    Orders:  -     REFERRAL TO UROLOGY    2. Microalbuminuria  - See above    3. Essential hypertension: Has not been keeping BP log d/t being very busy lately. Is not using NSAIDs or OC decongestants. HyperK resolved. GFR 58, improved from prior. May indicate new onset CKD if no acute renal insult on going. Will consider Losartan-HCTZ combo on next visit tailored to BP log results. - Counseled on DASH diet, exercise, caffeine intake, and weight loss  - Avoid NSAIDs such as ibuprofen, advil, motrin, aleve, and naproxen   - Avoid OTC decongestants such as pseudoephedrine and Afrin  - Encouraged to keep Daily BP log  - F/u 2-3 weeks       We discussed the expected course, resolution and complications of the diagnosis(es) in detail. Medication risks, benefits, costs, interactions, and alternatives were discussed as indicated. I advised him to contact the office if his condition worsens, changes or fails to improve as anticipated. He expressed understanding with the diagnosis(es) and plan.  Patient understands that this encounter was a temporary measure, and the importance of further follow up and examination was emphasized. Patient verbalized understanding. Electronically Signed: Daniel Le MD    Providers location when delivering service: Home      Pursuant to the emergency declaration under the [address] [place] [address], [facility] waiver authority and the [employer] Act, this Virtual  Visit was conducted, with patient's consent, to reduce the patient's risk of exposure to COVID-19 and provide continuity of care for an established patient. Services were provided through a video synchronous discussion virtually to substitute for in-person clinic visit.

## 2021-04-12 NOTE — ASSESSMENT & PLAN NOTE
Chronic since 2018, no gross hematuria, asymptomatic  Unsure if good control of HTN  (2018) CT w/ unremarkable kindeys and bladder was partially distended w/o mass or calculus.   Related to BPH?  (8/2020) Hgb wnl  (8/2020) Uric Acid 4.7, gout stable  (3/2021) GFR 58, Cr. 1.26

## 2021-06-01 ENCOUNTER — TELEPHONE (OUTPATIENT)
Dept: UROLOGY | Age: 61
End: 2021-06-01

## 2021-09-15 DIAGNOSIS — M10.9 GOUT, UNSPECIFIED CAUSE, UNSPECIFIED CHRONICITY, UNSPECIFIED SITE: ICD-10-CM

## 2021-09-15 DIAGNOSIS — I10 ESSENTIAL HYPERTENSION: ICD-10-CM

## 2021-09-15 DIAGNOSIS — E78.5 DYSLIPIDEMIA: ICD-10-CM

## 2021-09-16 RX ORDER — LOSARTAN POTASSIUM 100 MG/1
TABLET ORAL
Qty: 90 TABLET | Refills: 1 | Status: SHIPPED | OUTPATIENT
Start: 2021-09-16 | End: 2022-04-14

## 2021-09-16 RX ORDER — ALLOPURINOL 300 MG/1
TABLET ORAL
Qty: 90 TABLET | Refills: 1 | Status: SHIPPED | OUTPATIENT
Start: 2021-09-16 | End: 2022-04-14

## 2021-09-16 RX ORDER — ATORVASTATIN CALCIUM 40 MG/1
TABLET, FILM COATED ORAL
Qty: 90 TABLET | Refills: 1 | Status: SHIPPED | OUTPATIENT
Start: 2021-09-16 | End: 2022-04-14

## 2022-03-18 PROBLEM — Z87.19 S/P LAPAROSCOPIC HERNIA REPAIR: Status: ACTIVE | Noted: 2019-07-02

## 2022-03-18 PROBLEM — Z87.19 S/P VENTRAL HERNIORRHAPHY: Status: ACTIVE | Noted: 2019-07-02

## 2022-03-18 PROBLEM — Z98.890 S/P VENTRAL HERNIORRHAPHY: Status: ACTIVE | Noted: 2019-07-02

## 2022-03-18 PROBLEM — Z98.890 S/P LAPAROSCOPIC HERNIA REPAIR: Status: ACTIVE | Noted: 2019-07-02

## 2022-03-19 PROBLEM — N52.9 ERECTILE DYSFUNCTION: Status: ACTIVE | Noted: 2021-03-29

## 2022-03-19 PROBLEM — R31.21 ASYMPTOMATIC MICROSCOPIC HEMATURIA: Status: ACTIVE | Noted: 2021-04-12

## 2022-04-12 DIAGNOSIS — M10.9 GOUT, UNSPECIFIED CAUSE, UNSPECIFIED CHRONICITY, UNSPECIFIED SITE: ICD-10-CM

## 2022-04-12 DIAGNOSIS — E78.5 DYSLIPIDEMIA: ICD-10-CM

## 2022-04-12 DIAGNOSIS — I10 ESSENTIAL HYPERTENSION: ICD-10-CM

## 2022-04-14 RX ORDER — ALLOPURINOL 300 MG/1
TABLET ORAL
Qty: 90 TABLET | Refills: 1 | Status: SHIPPED | OUTPATIENT
Start: 2022-04-14 | End: 2022-04-27 | Stop reason: SINTOL

## 2022-04-14 RX ORDER — LOSARTAN POTASSIUM 100 MG/1
TABLET ORAL
Qty: 90 TABLET | Refills: 1 | Status: SHIPPED | OUTPATIENT
Start: 2022-04-14

## 2022-04-14 RX ORDER — ATORVASTATIN CALCIUM 40 MG/1
TABLET, FILM COATED ORAL
Qty: 90 TABLET | Refills: 1 | Status: SHIPPED | OUTPATIENT
Start: 2022-04-14

## 2022-04-22 ENCOUNTER — OFFICE VISIT (OUTPATIENT)
Dept: FAMILY MEDICINE CLINIC | Age: 62
End: 2022-04-22
Payer: COMMERCIAL

## 2022-04-22 VITALS
WEIGHT: 251 LBS | HEIGHT: 70 IN | DIASTOLIC BLOOD PRESSURE: 91 MMHG | OXYGEN SATURATION: 96 % | TEMPERATURE: 98 F | RESPIRATION RATE: 16 BRPM | BODY MASS INDEX: 35.93 KG/M2 | SYSTOLIC BLOOD PRESSURE: 155 MMHG | HEART RATE: 90 BPM

## 2022-04-22 DIAGNOSIS — N18.31 STAGE 3A CHRONIC KIDNEY DISEASE (HCC): ICD-10-CM

## 2022-04-22 DIAGNOSIS — Z82.49 FH: CAD (CORONARY ARTERY DISEASE): ICD-10-CM

## 2022-04-22 DIAGNOSIS — R73.9 HYPERGLYCEMIA: ICD-10-CM

## 2022-04-22 DIAGNOSIS — I10 PRIMARY HYPERTENSION: ICD-10-CM

## 2022-04-22 DIAGNOSIS — Z00.00 VISIT FOR WELL MAN HEALTH CHECK: Primary | ICD-10-CM

## 2022-04-22 DIAGNOSIS — Z13.31 DEPRESSION SCREENING NEGATIVE: ICD-10-CM

## 2022-04-22 DIAGNOSIS — E78.9 LIPID DISORDER: ICD-10-CM

## 2022-04-22 DIAGNOSIS — M10.9 GOUT, UNSPECIFIED CAUSE, UNSPECIFIED CHRONICITY, UNSPECIFIED SITE: ICD-10-CM

## 2022-04-22 DIAGNOSIS — Z13.220 SCREENING CHOLESTEROL LEVEL: ICD-10-CM

## 2022-04-22 DIAGNOSIS — R31.21 ASYMPTOMATIC MICROSCOPIC HEMATURIA: ICD-10-CM

## 2022-04-22 PROBLEM — N18.30 STAGE 3 CHRONIC KIDNEY DISEASE (HCC): Status: ACTIVE | Noted: 2022-04-22

## 2022-04-22 PROCEDURE — 99214 OFFICE O/P EST MOD 30 MIN: CPT | Performed by: STUDENT IN AN ORGANIZED HEALTH CARE EDUCATION/TRAINING PROGRAM

## 2022-04-22 PROCEDURE — 3725F SCREEN DEPRESSION PERFORMED: CPT | Performed by: STUDENT IN AN ORGANIZED HEALTH CARE EDUCATION/TRAINING PROGRAM

## 2022-04-22 PROCEDURE — 99396 PREV VISIT EST AGE 40-64: CPT | Performed by: STUDENT IN AN ORGANIZED HEALTH CARE EDUCATION/TRAINING PROGRAM

## 2022-04-22 NOTE — PROGRESS NOTES
Ronnie Andrade is a 64 y.o. male    Chief Complaint   Patient presents with    Blood Pressure Check     Patient is coming in for a bloos pressure follow up and wants to do possible blood work. No other concerns. 1. Have you been to the ER, urgent care clinic since your last visit? Hospitalized since your last visit? No  2. Have you seen or consulted any other health care providers outside of the [address] [place] [address] since your last visit? Include any pap smears or colon screening. No    Vitals:    04/22/22 1540 04/22/22 1550   BP: (!) 154/89 (!) 155/91   BP 1 Location: Right upper arm Left upper arm   BP Patient Position: Sitting Sitting   Pulse: 90    Temp: 98 °F (36.7 °C)    TempSrc: Oral    Resp: 16    Height: 5' 10\" (1.778 m)    Weight: 251 lb (113.9 kg)    SpO2: 96%            Health Maintenance Due   Topic Date Due    COVID-19 Vaccine (1) Never done    Shingrix Vaccine Age 50> (1 of 2) Never done    Depression Screen  08/14/2021    Lipid Screen  08/14/2021         Medication Reconciliation completed, changes noted.   Please  Update medication list.

## 2022-04-22 NOTE — PROGRESS NOTES
[address] [place] [address]   Office [phone], Fax [phone]      Chief Complaint:     Chief Complaint   Patient presents with    Blood Pressure Check     Patient is coming in for a bloos pressure follow up and wants to do possible blood work. No other concerns. Nirmal Ponce is a 64 y.o. male that presents for: Well man's and chronic conditions      Assessment/Plan:   I personally reviewed the following Pertinent Labs/Studies:   - Encounter Notes from 2021, Labs from 2021. Diagnoses and all orders for this visit:    1. Visit for Shoefitr man health check  Comments:  Declined shingles vaccine  Orders:  -     [address] [employer]    2. Primary hypertension  Comments:  Keep BP log and bring on next visit  Compliant with medications  Orders:  -     METABOLIC PANEL, COMPREHENSIVE; Future    3. Asymptomatic microscopic hematuria  Comments:  Lost to follow 2/2 to COVID and travel  CT of Kidneys in 2017 unrremarkable  Assessment & Plan:  Asymptomatic  Renal US, repeat UA and CBC  Likely will need referral again to Urology  CT 2017 kidneys unremarkable  DDx Dubin-Chad vs RCC vs less likely infection vs bladder pathology  No BPH, BL PSA wnl in 2017    Orders:  -     CBC W/O DIFF; Future  -     US RETROPERITONEUM COMP; Future  -     URINALYSIS W/MICROSCOPIC; Future    4. Gout, unspecified cause, unspecified chronicity, unspecified site  Assessment & Plan:  asymptomatic    Orders:  -     METABOLIC PANEL, COMPREHENSIVE; Future  -     URIC ACID; Future    5. Lipid disorder  -     LIPID PANEL; Future    6. Hyperglycemia  Assessment & Plan:  Lab Results   Component Value Date/Time    Hemoglobin A1c 5.7 (H) 08/14/2020 03:10 PM         Orders:  -     METABOLIC PANEL, COMPREHENSIVE; Future  -     HEMOGLOBIN A1C WITH EAG; Future    7. FH: CAD (coronary artery disease)    8. Screening cholesterol level  -     LIPID PANEL; Future    9.  Stage 3a chronic kidney disease ([facility] [address]  Assessment & Plan:  Repeat CMP      10. Depression screening negative         Follow up: Follow-up and Dispositions    · Return in about 13 days (around 5/5/2022) for Chronic conditions. Subjective:   HPI:  Tapan Villatoro is a 64 y.o. male that presents for Well man's exam. Doing well with no new concerns. [place] got back [place] country. Reports had no time to follow up on hematuria and specialist visits from our last meeting over a year ago. Reports blood pressure usually well controlled at home in the 120's. No recent gout flares. Denies Headache, CP, SOB, abdominal pain, flank pain, polyuria/polydipsia, Flank pain, hematuria, hematochezia/melena, increased frequency or hesitancy, and n/v/d/c       Health Maintenance:  Health Maintenance Due   Topic Date Due    COVID-19 Vaccine (1) Never done    Shingrix Vaccine Age 50> (1 of 2) Never done    Depression Screen  08/14/2021    Lipid Screen  08/14/2021        ROS:   Review of Systems   Constitutional: Negative for chills, diaphoresis, fever and malaise/fatigue. HENT: Negative for congestion, sinus pain and sore throat. Eyes: Negative for blurred vision. Respiratory: Negative for cough and shortness of breath. Cardiovascular: Negative for chest pain, palpitations, orthopnea and leg swelling. Gastrointestinal: Negative for abdominal pain, constipation, diarrhea, heartburn, nausea and vomiting. Genitourinary: Negative for dysuria and frequency. Neurological: Negative for dizziness and headaches. Endo/Heme/Allergies: Negative for polydipsia. Psychiatric/Behavioral: Negative for depression. Past medical history, social history, and medications personally reviewed. Past Medical History:   Diagnosis Date    Cataract     Gout     Hypercholesterolemia     Hypertension     Obesity (BMI 30.0-34. 9)         Allergies personally reviewed. No Known Allergies       Objective:   Vitals reviewed.   Visit Vitals  BP (!) 155/91 (BP 1 Location: Left upper arm, BP Patient Position: Sitting)   Pulse 90   Temp 98 °F (36.7 °C) (Oral)   Resp 16   Ht 5' 10\" (1.778 m)   Wt 251 lb (113.9 kg)   SpO2 96%   BMI 36.01 kg/m²        Physical Exam    Vitals Reviewed. General AO x 3. No distress. Not diaphoretic. No jaundice. No cyanosis. No pallor. Neck No thyromegaly present. No JVD. No cervical adenopathy. Cardio Normal rate, regular rhythm. No murmur, rubs, or gallop. Pulmonary Effort normal. No accessory muscle use. No wheezes, rales, or rhonchi. Abdominal Soft. Bowel sounds normal. No tenderness. No distension. Extremities No edema of lower extremities. Pulses 2+. Neurological CN II-XII grossly intact. No focal deficits. Skin No rash. Pt was discussed with Dr Anna Krueger (attending physician). I have reviewed pertinent labs results and other data. I have discussed the diagnosis with the patient and the intended plan as seen in the above orders. The patient has received an after-visit summary and questions were answered concerning future plans. I have discussed medication side effects and warnings with the patient as well.     Monster Thakkar MD  Resident [address]  04/22/22

## 2022-04-22 NOTE — ASSESSMENT & PLAN NOTE
Asymptomatic  Renal US, repeat UA and CBC  Likely will need referral again to Urology  CT 2017 kidneys unremarkable  DDx Dubin-Chad vs RCC vs less likely infection vs bladder pathology  No BPH, BL PSA wnl in 2017

## 2022-04-23 LAB
ALBUMIN SERPL-MCNC: 3.7 G/DL (ref 3.5–5)
ALBUMIN/GLOB SERPL: 1.1 {RATIO} (ref 1.1–2.2)
ALP SERPL-CCNC: 108 U/L (ref 45–117)
ALT SERPL-CCNC: 25 U/L (ref 12–78)
ANION GAP SERPL CALC-SCNC: 3 MMOL/L (ref 5–15)
APPEARANCE UR: CLEAR
AST SERPL-CCNC: 14 U/L (ref 15–37)
BACTERIA URNS QL MICRO: NEGATIVE /HPF
BILIRUB SERPL-MCNC: 0.9 MG/DL (ref 0.2–1)
BILIRUB UR QL: NEGATIVE
BUN SERPL-MCNC: 14 MG/DL (ref 6–20)
BUN/CREAT SERPL: 11 (ref 12–20)
CALCIUM SERPL-MCNC: 9.3 MG/DL (ref 8.5–10.1)
CHLORIDE SERPL-SCNC: 108 MMOL/L (ref 97–108)
CHOLEST SERPL-MCNC: 162 MG/DL
CO2 SERPL-SCNC: 28 MMOL/L (ref 21–32)
COLOR UR: ABNORMAL
CREAT SERPL-MCNC: 1.3 MG/DL (ref 0.7–1.3)
EPITH CASTS URNS QL MICRO: ABNORMAL /LPF
ERYTHROCYTE [DISTWIDTH] IN BLOOD BY AUTOMATED COUNT: 13.7 % (ref 11.5–14.5)
EST. AVERAGE GLUCOSE BLD GHB EST-MCNC: 120 MG/DL
GLOBULIN SER CALC-MCNC: 3.4 G/DL (ref 2–4)
GLUCOSE SERPL-MCNC: 92 MG/DL (ref 65–100)
GLUCOSE UR STRIP.AUTO-MCNC: NEGATIVE MG/DL
HBA1C MFR BLD: 5.8 % (ref 4–5.6)
HCT VFR BLD AUTO: 48.2 % (ref 36.6–50.3)
HDLC SERPL-MCNC: 50 MG/DL
HDLC SERPL: 3.2 {RATIO} (ref 0–5)
HGB BLD-MCNC: 14.8 G/DL (ref 12.1–17)
HGB UR QL STRIP: ABNORMAL
HYALINE CASTS URNS QL MICRO: ABNORMAL /LPF (ref 0–5)
KETONES UR QL STRIP.AUTO: NEGATIVE MG/DL
LDLC SERPL CALC-MCNC: 83.8 MG/DL (ref 0–100)
LEUKOCYTE ESTERASE UR QL STRIP.AUTO: NEGATIVE
MCH RBC QN AUTO: 27.2 PG (ref 26–34)
MCHC RBC AUTO-ENTMCNC: 30.7 G/DL (ref 30–36.5)
MCV RBC AUTO: 88.6 FL (ref 80–99)
NITRITE UR QL STRIP.AUTO: NEGATIVE
NRBC # BLD: 0 K/UL (ref 0–0.01)
NRBC BLD-RTO: 0 PER 100 WBC
PH UR STRIP: 5.5 [PH] (ref 5–8)
PLATELET # BLD AUTO: 224 K/UL (ref 150–400)
PMV BLD AUTO: 10.2 FL (ref 8.9–12.9)
POTASSIUM SERPL-SCNC: 4.6 MMOL/L (ref 3.5–5.1)
PROT SERPL-MCNC: 7.1 G/DL (ref 6.4–8.2)
PROT UR STRIP-MCNC: 100 MG/DL
RBC # BLD AUTO: 5.44 M/UL (ref 4.1–5.7)
RBC #/AREA URNS HPF: ABNORMAL /HPF (ref 0–5)
SODIUM SERPL-SCNC: 139 MMOL/L (ref 136–145)
SP GR UR REFRACTOMETRY: 1.01 (ref 1–1.03)
TRIGL SERPL-MCNC: 141 MG/DL (ref ?–150)
UR CULT HOLD, URHOLD: NORMAL
URATE SERPL-MCNC: 5.7 MG/DL (ref 3.5–7.2)
UROBILINOGEN UR QL STRIP.AUTO: 0.2 EU/DL (ref 0.2–1)
VLDLC SERPL CALC-MCNC: 28.2 MG/DL
WBC # BLD AUTO: 7 K/UL (ref 4.1–11.1)
WBC URNS QL MICRO: ABNORMAL /HPF (ref 0–4)

## 2022-04-27 DIAGNOSIS — M10.9 GOUT, UNSPECIFIED CAUSE, UNSPECIFIED CHRONICITY, UNSPECIFIED SITE: ICD-10-CM

## 2022-04-27 RX ORDER — ALLOPURINOL 100 MG/1
50 TABLET ORAL DAILY
Qty: 30 TABLET | Refills: 0 | Status: SHIPPED | OUTPATIENT
Start: 2022-04-27 | End: 2022-05-05

## 2022-04-27 NOTE — PROGRESS NOTES
UA with 100 protein and small blood  A1c 5.8  Lipid Panel wnl  Uric Acid wnl  CBC wnl  CMP with stable CKD3 with proteinuria/hematuria in s/o allopurinol use  - decrease allopurinol to 50 mg daily  - Repeat UA and BMP on follow up

## 2022-04-28 ENCOUNTER — HOSPITAL ENCOUNTER (OUTPATIENT)
Dept: ULTRASOUND IMAGING | Age: 62
Discharge: HOME | End: 2022-04-28
Attending: STUDENT IN AN ORGANIZED HEALTH CARE EDUCATION/TRAINING PROGRAM
Payer: COMMERCIAL

## 2022-04-28 DIAGNOSIS — R31.21 ASYMPTOMATIC MICROSCOPIC HEMATURIA: ICD-10-CM

## 2022-04-28 PROCEDURE — 76770 US EXAM ABDO BACK WALL COMP: CPT

## 2022-05-05 ENCOUNTER — OFFICE VISIT (OUTPATIENT)
Dept: FAMILY MEDICINE CLINIC | Age: 62
End: 2022-05-05
Payer: COMMERCIAL

## 2022-05-05 VITALS
DIASTOLIC BLOOD PRESSURE: 85 MMHG | HEART RATE: 67 BPM | RESPIRATION RATE: 16 BRPM | BODY MASS INDEX: 35.44 KG/M2 | WEIGHT: 247 LBS | OXYGEN SATURATION: 97 % | SYSTOLIC BLOOD PRESSURE: 138 MMHG

## 2022-05-05 DIAGNOSIS — I10 PRIMARY HYPERTENSION: Primary | ICD-10-CM

## 2022-05-05 DIAGNOSIS — N18.31 STAGE 3A CHRONIC KIDNEY DISEASE (HCC): ICD-10-CM

## 2022-05-05 DIAGNOSIS — E66.9 OBESITY (BMI 35.0-39.9 WITHOUT COMORBIDITY): ICD-10-CM

## 2022-05-05 DIAGNOSIS — R31.21 ASYMPTOMATIC MICROSCOPIC HEMATURIA: ICD-10-CM

## 2022-05-05 DIAGNOSIS — M10.9 GOUT, UNSPECIFIED CAUSE, UNSPECIFIED CHRONICITY, UNSPECIFIED SITE: ICD-10-CM

## 2022-05-05 DIAGNOSIS — R80.1 PERSISTENT PROTEINURIA: ICD-10-CM

## 2022-05-05 PROBLEM — H52.4 PRESBYOPIA: Status: ACTIVE | Noted: 2022-05-05

## 2022-05-05 PROBLEM — R31.9 HEMATURIA: Status: ACTIVE | Noted: 2022-05-05

## 2022-05-05 LAB
BILIRUB UR QL STRIP: NEGATIVE
GLUCOSE UR-MCNC: NEGATIVE MG/DL
KETONES P FAST UR STRIP-MCNC: NEGATIVE MG/DL
PH UR STRIP: 6.5 [PH] (ref 4.6–8)
PROT UR QL STRIP: NORMAL
SP GR UR STRIP: 1.01 (ref 1–1.03)
UA UROBILINOGEN AMB POC: NORMAL (ref 0.2–1)
URINALYSIS CLARITY POC: CLEAR
URINALYSIS COLOR POC: YELLOW
URINE BLOOD POC: NORMAL
URINE LEUKOCYTES POC: NEGATIVE
URINE NITRITES POC: NEGATIVE

## 2022-05-05 PROCEDURE — 81003 URINALYSIS AUTO W/O SCOPE: CPT | Performed by: STUDENT IN AN ORGANIZED HEALTH CARE EDUCATION/TRAINING PROGRAM

## 2022-05-05 PROCEDURE — 99214 OFFICE O/P EST MOD 30 MIN: CPT | Performed by: STUDENT IN AN ORGANIZED HEALTH CARE EDUCATION/TRAINING PROGRAM

## 2022-05-05 RX ORDER — ALLOPURINOL 100 MG/1
100 TABLET ORAL DAILY
Qty: 30 TABLET | Refills: 0 | Status: SHIPPED | OUTPATIENT
Start: 2022-05-05 | End: 2022-05-31

## 2022-05-05 RX ORDER — HYDROCHLOROTHIAZIDE 25 MG/1
25 TABLET ORAL DAILY
Qty: 30 TABLET | Refills: 1 | Status: SHIPPED | OUTPATIENT
Start: 2022-05-05 | End: 2022-05-05

## 2022-05-05 RX ORDER — AMLODIPINE BESYLATE 5 MG/1
5 TABLET ORAL DAILY
Qty: 30 TABLET | Refills: 1 | Status: SHIPPED | OUTPATIENT
Start: 2022-05-05 | End: 2022-06-27

## 2022-05-05 NOTE — ASSESSMENT & PLAN NOTE
Changed recently to 50 of allopurinol. No change in hematuria.   Can continue with 100 mg of allopurinol  Will re-establish minimal dose required to achieve therapeutic uric acid level  Repeat uric acid in 3 months

## 2022-05-05 NOTE — ASSESSMENT & PLAN NOTE
Asymptomatic  Renal US w/o abnormality to cause hematuria. Incidental left renal cyst noted.  No follow up required per radiologist  CT 2017 kidneys unremarkable  RCC ruled out  POC UA w/ Microscopy today w/ RBC's present  Reduced allopurinol to renal dosing w/o affect  Patient ok to continue allopurinol at 100 mg daily  DDx CKD vs bladder/prostate pathology   Liver function, total bili wnl, no anemia  Referred to Uro for possible scope if indicated

## 2022-05-05 NOTE — ASSESSMENT & PLAN NOTE
Not at goal  Continue Losartan 100  START Amlodipine 5 mg daily  Counseled on weight loss, diet, and exercise

## 2022-05-05 NOTE — PROGRESS NOTES
Chief Complaint   Patient presents with    Hypertension     1. Have you been to the ER, urgent care clinic since your last visit? Hospitalized since your last visit? No    2. Have you seen or consulted any other health care providers outside of the [address] [place] [address] since your last visit? Include any pap smears or colon screening.  No

## 2022-05-05 NOTE — ASSESSMENT & PLAN NOTE
- Counseled on diet, exercise, and weight loss  - 2-4 lbs weight loss per month  - Intermittent fasting, no food past 7PM, smaller meals, low carbs, increase fiber/protein  - Select one bad food or snack per weak and decrease significantly or eliminate completely  - Don't drink your calories  - Weekly weight and sleep log  - Try to keep a meal diary or use paulina

## 2022-05-05 NOTE — PROGRESS NOTES
[address] [facility] [address] [place] [address]   Office [phone], Fax [phone]      Chief Complaint:     Chief Complaint   Patient presents with    Hypertension       Eli Mohr is a 64 y.o. male that presents for: Chronic conditions      Assessment/Plan:   I personally reviewed the following Pertinent Labs/Studies:   - Encounter Notes from 2022, Labs from 2022, Imaging 2022      Diagnoses and all orders for this visit:    1. Primary hypertension  Assessment & Plan:  Not at goal  Continue Losartan 100  START Amlodipine 5 mg daily  Counseled on weight loss, diet, and exercise        2. Stage 3a chronic kidney disease ([facility] [address]  Assessment & Plan: With chronic hematuria and proteinuria  Will get microalb ratio if > 300 pt to see nephrology    Orders:  -     REFERRAL TO NEPHROLOGY  -     MICROALBUMIN, UR, RAND W/ MICROALB/CREAT RATIO; Future    3. Asymptomatic microscopic hematuria  Assessment & Plan:  Asymptomatic  Renal US w/o abnormality to cause hematuria. Incidental left renal cyst noted. No follow up required per radiologist  CT 2017 kidneys unremarkable  RCC ruled out  POC UA w/ Microscopy today w/ RBC's present  Reduced allopurinol to renal dosing w/o affect  Patient ok to continue allopurinol at 100 mg daily  DDx CKD vs bladder/prostate pathology   Liver function, total bili wnl, no anemia  Referred to Uro for possible scope if indicated    Orders:  -     AMB POC URINALYSIS DIP STICK AUTO W/O MICRO  -     REFERRAL TO UROLOGY  -     REFERRAL TO NEPHROLOGY  -     MICROALBUMIN, UR, RAND W/ MICROALB/CREAT RATIO; Future    4. Persistent proteinuria  Assessment & Plan:  See above    Orders:  -     MICROALBUMIN, UR, RAND W/ MICROALB/CREAT RATIO; Future    5. Gout, unspecified cause, unspecified chronicity, unspecified site  Assessment & Plan:  Changed recently to 50 of allopurinol. No change in hematuria.   Can continue with 100 mg of allopurinol  Will re-establish minimal dose required to achieve therapeutic uric acid level  Repeat uric acid in 3 months    Orders:  -     allopurinoL (ZYLOPRIM) 100 mg tablet; Take 1 Tablet by mouth daily. 6. Obesity (BMI 35.0-39.9 without comorbidity)  Assessment & Plan:  - Counseled on diet, exercise, and weight loss  - 2-4 lbs weight loss per month  - Intermittent fasting, no food past 7PM, smaller meals, low carbs, increase fiber/protein  - Select one bad food or snack per weak and decrease significantly or eliminate completely  - Don't drink your calories  - Weekly weight and sleep log  - Try to keep a meal diary or use paulina          Follow up: Follow-up and Dispositions    · Return in about 2 weeks (around 5/19/2022) for HTN. Follow-up and Disposition History          Subjective:   HPI:  Lucio Zabala is a 64 y.o. male that presents for Chronic conditions. Reports doing well. No new concerns. Denies headache, fatigue, dizziness, CP, SOB, joint pain, abdominal/back/flank pain, dyuria/polyuria/nocturia, urinary issues, and n/v      ROS:   Review of Systems   Constitutional: Negative for chills, fever and malaise/fatigue. Respiratory: Negative for shortness of breath. Cardiovascular: Negative for chest pain. Gastrointestinal: Negative for abdominal pain, blood in stool, melena, nausea and vomiting. Genitourinary: Negative for dysuria, flank pain, frequency, hematuria and urgency. Musculoskeletal: Negative for back pain, falls and joint pain. Neurological: Negative for dizziness and headaches. Past medical history, social history, and medications personally reviewed. Past Medical History:   Diagnosis Date    Cataract     Gout     Hypercholesterolemia     Hypertension     Obesity (BMI 30.0-34. 9)         Allergies personally reviewed. No Known Allergies       Objective:   Vitals reviewed.   Visit Vitals  /85 (BP 1 Location: Left upper arm, BP Patient Position: Sitting, BP Cuff Size: Adult)   Pulse 67   Resp 16   Wt 247 lb (112 kg) SpO2 97%   BMI 35.44 kg/m²        Physical Exam    Vitals Reviewed. General AO x 3. No distress. Not diaphoretic. No jaundice. No cyanosis. No pallor. Cardio Normal rate, regular rhythm. No murmur, rubs, or gallop. Pulmonary Effort normal. No accessory muscle use. No wheezes, rales, or rhonchi. Abdominal Soft. Bowel sounds normal. No tenderness. No distension. Extremities No edema of lower extremities. Pulses 2+. Neurological CN II-XII grossly intact. No focal deficits. Skin No rash. Pt was discussed with Dr Ranjit Fernandez (attending physician). I have reviewed pertinent labs results and other data. I have discussed the diagnosis with the patient and the intended plan as seen in the above orders. The patient has received an after-visit summary and questions were answered concerning future plans. I have discussed medication side effects and warnings with the patient as well.     Erwin Rose MD  Resident [facility]  05/05/22

## 2022-05-06 LAB
CREAT UR-MCNC: 51.7 MG/DL
MICROALBUMIN UR-MCNC: 70.8 MG/DL
MICROALBUMIN/CREAT UR-RTO: 1369 MG/G (ref 0–30)

## 2022-05-11 ENCOUNTER — TELEPHONE (OUTPATIENT)
Dept: FAMILY MEDICINE CLINIC | Age: 62
End: 2022-05-11

## 2022-05-11 NOTE — TELEPHONE ENCOUNTER
----- Message from Sudeep Shoemaker sent at 5/11/2022 10:01 AM EDT -----  Subject: Referral Request    QUESTIONS   Reason for referral request? pt was referred to Dr. Fina Carrero by   pcp---[facility] told they have not   received any paperwork--please fax over the referral to F# [phone] or   call P# [phone] --referral # [id number]---VEICHK[place]YUSH [facility]   Has the physician seen you for this condition before? No   Preferred Specialist (if applicable)? Nataliia Taylor  Do you already have an appointment scheduled? No  Additional Information for Provider? Please all pt once the referral has   been sent so he can call to [facility] his appt  ---------------------------------------------------------------------------  --------------  [address]  What is the best way for the office to contact you? OK to leave message on   voicemail  Preferred Call Back Phone Number? [phone]  ---------------------------------------------------------------------------  --------------  SCRIPT ANSWERS  Relationship to Patient?  Self

## 2022-05-27 ENCOUNTER — OFFICE VISIT (OUTPATIENT)
Dept: FAMILY MEDICINE CLINIC | Age: 62
End: 2022-05-27
Payer: COMMERCIAL

## 2022-05-27 VITALS — DIASTOLIC BLOOD PRESSURE: 76 MMHG | HEART RATE: 64 BPM | OXYGEN SATURATION: 97 % | SYSTOLIC BLOOD PRESSURE: 126 MMHG

## 2022-05-27 DIAGNOSIS — R31.21 ASYMPTOMATIC MICROSCOPIC HEMATURIA: Primary | ICD-10-CM

## 2022-05-27 DIAGNOSIS — N18.31 STAGE 3A CHRONIC KIDNEY DISEASE (HCC): ICD-10-CM

## 2022-05-27 DIAGNOSIS — R80.9 MICROALBUMINURIA: ICD-10-CM

## 2022-05-27 PROCEDURE — 99213 OFFICE O/P EST LOW 20 MIN: CPT | Performed by: STUDENT IN AN ORGANIZED HEALTH CARE EDUCATION/TRAINING PROGRAM

## 2022-05-27 NOTE — PROGRESS NOTES
[address] [facility] [address] [place] [address]   Office [phone], Fax [phone]      Chief Complaint:     Chief Complaint   Patient presents with    Follow-up     go over lab work       Ayala Mccarthy is a 64 y.o. male that presents for: Chronic conditions      Assessment/Plan:   I personally reviewed the following Pertinent Labs/Studies:   - Encounter Notes from 2022, Labs from 2022. Diagnoses and all orders for this visit:    1. Asymptomatic microscopic hematuria  Comments:  Unclear etiology  US w/o abnormality to cause hematuria  Patient reports Uro w/u unremarkable  Prostate/Bladder etiology ruled out  Has been unable to get a hold of Nephrology  Asymptomatic  Will put in open referral, pt to call insurance for providers  Orders:  -     REFERRAL TO NEPHROLOGY    2. Stage 3a chronic kidney disease ([facility] [address]  Comments:  Stable. On ARB  Orders:  -     REFERRAL TO NEPHROLOGY    3. Microalbuminuria  -     REFERRAL TO NEPHROLOGY           Follow up: Follow-up and Dispositions    · Return in about 3 months (around 8/27/2022) for gout. Subjective:   HPI:  Ayala Mccarthy is a 64 y.o. male that presents for chronic conditions. Reports doing well w/o concerns. Still does not notice gross blood in urine. Not taking ASA. Reports cleared by Urology (w/u unremarkable). Having issues getting in with Nephrology. Denies fatigue, dizziness, visual distrubance, headache, CP, SOB, Abdominal pain, LE edema, and n/v.      ROS:   Review of Systems   Constitutional: Negative for chills, diaphoresis and fever. HENT: Negative for congestion, sinus pain and sore throat. Eyes: Negative for blurred vision. Respiratory: Negative for cough and shortness of breath. Cardiovascular: Negative for chest pain, palpitations, orthopnea and leg swelling. Gastrointestinal: Negative for abdominal pain, constipation, diarrhea, heartburn, nausea and vomiting. Genitourinary: Negative for dysuria and frequency. Neurological: Negative for dizziness and headaches. Endo/Heme/Allergies: Negative for polydipsia. Past medical history, social history, and medications personally reviewed. Past Medical History:   Diagnosis Date    Cataract     Gout     Hypercholesterolemia     Hypertension     Obesity (BMI 30.0-34. 9)         Allergies personally reviewed. No Known Allergies       Objective:   Vitals reviewed. Visit Vitals  /76 (BP 1 Location: Left arm, BP Patient Position: Sitting, BP Cuff Size: Adult)   Pulse 64   SpO2 97%        Physical Exam    Vitals Reviewed. General AO x 3. No distress. Not diaphoretic. No jaundice. No cyanosis. No pallor. Cardio Normal rate, regular rhythm. Pulmonary Effort normal. No accessory muscle use. Extremities No edema of lower extremities. Pulses 2+. Neurological CN II-XII grossly intact. No focal deficits. Pt was discussed with Dr Michel Beauchamp (attending physician). I have reviewed pertinent labs results and other data. I have discussed the diagnosis with the patient and the intended plan as seen in the above orders. The patient has received an after-visit summary and questions were answered concerning future plans. I have discussed medication side effects and warnings with the patient as well.     Paulette Andres MD  Resident [address]  05/27/22

## 2022-05-27 NOTE — PROGRESS NOTES
Shawna Norwood is a 64 y.o. male    Chief Complaint   Patient presents with    Follow-up     go over lab work       1. Have you been to the ER, urgent care clinic since your last visit? Hospitalized since your last visit?no  2. Have you seen or consulted any other health care providers outside of the [address] [place] [address] since your last visit? Include any pap smears or colon screening.  none    Visit Vitals  /76 (BP 1 Location: Left arm, BP Patient Position: Sitting, BP Cuff Size: Adult)   Pulse 64   SpO2 97%     3 most recent PHQ Screens 4/22/2022   Little interest or pleasure in doing things Not at all   Feeling down, depressed, irritable, or hopeless Not at all   Total Score PHQ 2 0     Health Maintenance Due   Topic Date Due    COVID-19 Vaccine (1) Never done    Shingrix Vaccine Age 50> (1 of 2) Never done

## 2022-05-27 NOTE — ASSESSMENT & PLAN NOTE
Unclear etiology  US w/o abnormality to cause hematuria  Patient reports Uro w/u unremarkable  Prostate/Bladder etiology ruled out  Has been unable to get a hold of Nephrology  Asymptomatic  Will put in open referral, pt to call insurance for providers 98.1

## 2022-05-29 DIAGNOSIS — M10.9 GOUT, UNSPECIFIED CAUSE, UNSPECIFIED CHRONICITY, UNSPECIFIED SITE: ICD-10-CM

## 2022-05-31 RX ORDER — ALLOPURINOL 100 MG/1
100 TABLET ORAL DAILY
Qty: 30 TABLET | Refills: 2 | Status: SHIPPED | OUTPATIENT
Start: 2022-05-31 | End: 2022-06-29

## 2022-06-01 ENCOUNTER — TELEPHONE (OUTPATIENT)
Dept: FAMILY MEDICINE CLINIC | Age: 62
End: 2022-06-01

## 2022-06-27 DIAGNOSIS — I10 PRIMARY HYPERTENSION: ICD-10-CM

## 2022-06-27 RX ORDER — AMLODIPINE BESYLATE 5 MG/1
5 TABLET ORAL DAILY
Qty: 30 TABLET | Refills: 1 | Status: SHIPPED | OUTPATIENT
Start: 2022-06-27 | End: 2022-06-27

## 2022-06-29 DIAGNOSIS — M10.9 GOUT, UNSPECIFIED CAUSE, UNSPECIFIED CHRONICITY, UNSPECIFIED SITE: ICD-10-CM

## 2022-06-29 RX ORDER — ALLOPURINOL 100 MG/1
100 TABLET ORAL DAILY
Qty: 30 TABLET | Refills: 2 | Status: SHIPPED | OUTPATIENT
Start: 2022-06-29 | End: 2022-10-14

## 2022-08-30 DIAGNOSIS — I10 PRIMARY HYPERTENSION: ICD-10-CM

## 2022-09-02 RX ORDER — AMLODIPINE BESYLATE 5 MG/1
5 TABLET ORAL DAILY
Qty: 90 TABLET | Refills: 3 | OUTPATIENT
Start: 2022-09-02

## 2022-09-02 NOTE — TELEPHONE ENCOUNTER
Sent patient a Edictive message stating his amlodipine was sent to his pharmacy in June 2022 as a 90 day supply with enough refills for a year and for him to check with his pharmacy.

## 2022-09-08 DIAGNOSIS — I10 PRIMARY HYPERTENSION: ICD-10-CM

## 2022-09-08 DIAGNOSIS — E78.5 DYSLIPIDEMIA: ICD-10-CM

## 2022-09-08 DIAGNOSIS — M10.9 GOUT, UNSPECIFIED CAUSE, UNSPECIFIED CHRONICITY, UNSPECIFIED SITE: ICD-10-CM

## 2022-09-08 DIAGNOSIS — I10 ESSENTIAL HYPERTENSION: ICD-10-CM

## 2022-09-08 RX ORDER — ATORVASTATIN CALCIUM 40 MG/1
40 TABLET, FILM COATED ORAL DAILY
Qty: 90 TABLET | Refills: 1 | OUTPATIENT
Start: 2022-09-08

## 2022-09-08 RX ORDER — AMLODIPINE BESYLATE 5 MG/1
5 TABLET ORAL DAILY
Qty: 90 TABLET | Refills: 3 | OUTPATIENT
Start: 2022-09-08

## 2022-09-08 RX ORDER — LOSARTAN POTASSIUM 100 MG/1
100 TABLET ORAL DAILY
Qty: 90 TABLET | Refills: 1 | OUTPATIENT
Start: 2022-09-08

## 2022-09-08 RX ORDER — ALLOPURINOL 100 MG/1
100 TABLET ORAL DAILY
Qty: 30 TABLET | Refills: 2 | OUTPATIENT
Start: 2022-09-08

## 2022-09-08 NOTE — TELEPHONE ENCOUNTER
Called patient's pharmacy. Has active Rx ready to be picked up and does not need additional prescription sent at this time. Message sent to  to notify patient that Rx are ready for .

## 2022-09-08 NOTE — TELEPHONE ENCOUNTER
Patient stated that she was leaving the country at the end of the month and wanted all of his medications refilled. He would like for them to be refilled by next week if possible. Thanks!

## 2022-10-11 DIAGNOSIS — M10.9 GOUT, UNSPECIFIED CAUSE, UNSPECIFIED CHRONICITY, UNSPECIFIED SITE: ICD-10-CM

## 2022-10-14 RX ORDER — ALLOPURINOL 100 MG/1
100 TABLET ORAL DAILY
Qty: 30 TABLET | Refills: 2 | Status: SHIPPED | OUTPATIENT
Start: 2022-10-14

## 2022-12-21 ENCOUNTER — OFFICE VISIT (OUTPATIENT)
Dept: FAMILY MEDICINE CLINIC | Age: 62
End: 2022-12-21

## 2022-12-21 VITALS
OXYGEN SATURATION: 96 % | WEIGHT: 239.5 LBS | BODY MASS INDEX: 34.36 KG/M2 | SYSTOLIC BLOOD PRESSURE: 128 MMHG | HEART RATE: 94 BPM | DIASTOLIC BLOOD PRESSURE: 85 MMHG

## 2022-12-21 DIAGNOSIS — M25.522 LEFT ELBOW PAIN: Primary | ICD-10-CM

## 2022-12-21 PROCEDURE — 99213 OFFICE O/P EST LOW 20 MIN: CPT | Performed by: STUDENT IN AN ORGANIZED HEALTH CARE EDUCATION/TRAINING PROGRAM

## 2022-12-21 PROCEDURE — 3074F SYST BP LT 130 MM HG: CPT | Performed by: STUDENT IN AN ORGANIZED HEALTH CARE EDUCATION/TRAINING PROGRAM

## 2022-12-21 PROCEDURE — 3078F DIAST BP <80 MM HG: CPT | Performed by: STUDENT IN AN ORGANIZED HEALTH CARE EDUCATION/TRAINING PROGRAM

## 2022-12-21 NOTE — PROGRESS NOTES
[address] [facility] [address] [place] [address]   Office [phone], Fax [phone]    Subjective:     Chief Complaint   Patient presents with    Pain (Chronic)     Left elbow pain     History provided by patient     Analy Fairchild is a 58 y.o. male presents for L elbow pain. Liat Speak out of a chair about 2-3 months ago and landed directly onto that elbow. There was some ecchymosis and mild edema initially that has now resolved. He was in [place] at the time and did not seek medical care. He continues to have some intermittent medial elbow pain and some weakness when lifting heavy objects. He denies numbness, tingling. He works for the [employer] and is typing on a computer most of the day. He has not tried anything for his symptoms. Medication reviewed. Current Outpatient Medications:     allopurinoL (ZYLOPRIM) 100 mg tablet, TAKE 1 TABLET BY MOUTH DAILY, Disp: 30 Tablet, Rfl: 2    amLODIPine (NORVASC) 5 mg tablet, TAKE 1 TABLET BY MOUTH DAILY, Disp: 90 Tablet, Rfl: 3    losartan (COZAAR) 100 mg tablet, TAKE 1 TABLET BY MOUTH DAILY, Disp: 90 Tablet, Rfl: 1    atorvastatin (LIPITOR) 40 mg tablet, TAKE 1 TABLET BY MOUTH DAILY, Disp: 90 Tablet, Rfl: 1     Allergy reviewed. No Known Allergies     Past Medical History:   Diagnosis Date    Cataract     Gout     Hypercholesterolemia     Hypertension     Obesity (BMI 30.0-34. 9)        Social History     Socioeconomic History    Marital status:    Tobacco Use    Smoking status: Never    Smokeless tobacco: Never   Substance and Sexual Activity    Alcohol use: No    Drug use: No       ROS:  General/Constitutional:  No headache, fever, fatigue, weight loss or weight gain     Eyes:  No redness, pain, visual changes    Ears:  No pain, loss or changes in hearin    Neck:  No stiffness, pain, or limited movement  Cardiac:   No chest pain    Respiratory:  No cough or shortness of breath    GI:  No nausea/vomiting, diarrhea, abdominal pain     :  No dysuria or hematuria   Neurological:  No dizziness, memory changes,    Skin: No rash     Objective:   Vitals - reviewed  Visit Vitals  /85 (BP 1 Location: Right arm, BP Patient Position: Sitting, BP Cuff Size: Adult long)   Pulse 94   Wt 239 lb 8 oz (108.6 kg)   SpO2 96%   BMI 34.36 kg/m²        Physical exam:   GEN: NAD. Alert. Well nourished. EYES:  Conjunctiva clear  PSYCH: appropriate mood and affect. Good insight and judgement. Cooperative. SKIN: No obvious rashes or lesions. Elbow: Left  Deformity: None  Effusion: None  Ecchymosis: None    ROM:  Flexion:150  Extension: 0  Supination: 70  Pronation: 70    Palpation:  Medial epicondyle: Tenderness  Lateral epicondyle: No tenderness  Radial head: No tenderness    Strength (0-5/5):  Flexion: 5/5   Extension: 5/5  Supination: 5/5  Pronation: 5/5   : 5/5  Intrinsics: 5/5  EPL (extensor pollicis longus): 5/5  Pinch mechanism: 5/5    Neuro/Vascular:  Pulses intact    Pertinent Labs/Studies:   - Elbow XR: no acute findings, no fracture or dislocation, no joint effusion, small chronic calcification near distal triceps attachment     Assessment and orders:       ICD-10-CM ICD-9-CM    1. Left elbow pain  M25.522 719.42 XR ELBOW LT MIN 3 V        Left elbow pain: May be related to flexor tendon damage/inflammation at insertion to medial epicondyle. No fracture on XR  - Discussed PT vs home exercises. Patient interested in home exercises for now  - Ice prn   - Topical voltaren gel  - Elbow counterforce brace       Follow up in 6 weeks if no improvement     Pt was discussed with Dr Nico Redding (attending physician). I have reviewed patient medical and social history and medications. I have reviewed pertinent labs results and other data. I have discussed the diagnosis with the patient and the intended plan as seen in the above orders. The patient has received an after-visit summary and questions were answered concerning future plans.  I have discussed medication side effects and warnings with the patient as well.     Christa Mccollum, DO  Resident [facility]  12/21/22

## 2022-12-21 NOTE — PROGRESS NOTES
Chief Complaint   Patient presents with    Pain (Chronic)     Left elbow pain     Visit Vitals  /85 (BP 1 Location: Right arm, BP Patient Position: Sitting, BP Cuff Size: Adult long)   Pulse (!) 107   Wt 239 lb 8 oz (108.6 kg)   SpO2 95%   BMI 34.36 kg/m²

## 2023-03-16 DIAGNOSIS — I10 ESSENTIAL HYPERTENSION: ICD-10-CM

## 2023-03-23 RX ORDER — LOSARTAN POTASSIUM 100 MG/1
TABLET ORAL
Qty: 90 TABLET | Refills: 1 | Status: SHIPPED | OUTPATIENT
Start: 2023-03-23

## 2023-04-28 DIAGNOSIS — M10.9 GOUT, UNSPECIFIED CAUSE, UNSPECIFIED CHRONICITY, UNSPECIFIED SITE: ICD-10-CM

## 2023-05-01 RX ORDER — ALLOPURINOL 100 MG/1
100 TABLET ORAL DAILY
Qty: 90 TABLET | Refills: 0 | Status: SHIPPED | OUTPATIENT
Start: 2023-05-01

## 2023-05-22 RX ORDER — ALLOPURINOL 100 MG/1
100 TABLET ORAL DAILY
COMMUNITY
Start: 2023-05-01

## 2023-05-22 RX ORDER — LOSARTAN POTASSIUM 100 MG/1
1 TABLET ORAL DAILY
COMMUNITY
Start: 2023-03-23

## 2023-05-22 RX ORDER — AMLODIPINE BESYLATE 5 MG/1
5 TABLET ORAL DAILY
COMMUNITY
Start: 2022-06-27

## 2023-05-22 RX ORDER — ATORVASTATIN CALCIUM 40 MG/1
1 TABLET, FILM COATED ORAL DAILY
COMMUNITY
Start: 2022-04-14 | End: 2023-05-30 | Stop reason: SDUPTHER

## 2023-05-30 DIAGNOSIS — E78.5 DYSLIPIDEMIA: Primary | ICD-10-CM

## 2023-05-30 RX ORDER — ATORVASTATIN CALCIUM 40 MG/1
40 TABLET, FILM COATED ORAL DAILY
Qty: 90 TABLET | Refills: 0 | Status: SHIPPED | OUTPATIENT
Start: 2023-05-30

## 2023-05-30 NOTE — TELEPHONE ENCOUNTER
Received a faxed medication refill request from [facility]. Patient is requesting a 90 day supply of atorvastatin 40mg tabs. Patient was last seen 5/27/22.

## 2023-07-11 DIAGNOSIS — E78.5 DYSLIPIDEMIA: ICD-10-CM

## 2023-07-13 RX ORDER — ATORVASTATIN CALCIUM 40 MG/1
40 TABLET, FILM COATED ORAL DAILY
Qty: 90 TABLET | Refills: 0 | Status: SHIPPED | OUTPATIENT
Start: 2023-07-13

## 2023-08-01 ENCOUNTER — OFFICE VISIT (OUTPATIENT)
Age: 63
End: 2023-08-01
Payer: COMMERCIAL

## 2023-08-01 VITALS
HEIGHT: 70 IN | TEMPERATURE: 97.1 F | WEIGHT: 252 LBS | RESPIRATION RATE: 18 BRPM | OXYGEN SATURATION: 98 % | SYSTOLIC BLOOD PRESSURE: 138 MMHG | BODY MASS INDEX: 36.08 KG/M2 | DIASTOLIC BLOOD PRESSURE: 84 MMHG | HEART RATE: 76 BPM

## 2023-08-01 DIAGNOSIS — Z00.00 LABORATORY TESTS ORDERED AS PART OF A COMPLETE PHYSICAL EXAM (CPE): Primary | ICD-10-CM

## 2023-08-01 DIAGNOSIS — Z00.00 ENCOUNTER FOR WELL ADULT EXAM WITHOUT ABNORMAL FINDINGS: ICD-10-CM

## 2023-08-01 LAB
APPEARANCE UR: CLEAR
BACTERIA URNS QL MICRO: NEGATIVE /HPF
BILIRUB UR QL: NEGATIVE
COLOR UR: ABNORMAL
EPITH CASTS URNS QL MICRO: ABNORMAL /LPF
ERYTHROCYTE [DISTWIDTH] IN BLOOD BY AUTOMATED COUNT: 13.6 % (ref 11.5–14.5)
GLUCOSE UR STRIP.AUTO-MCNC: NEGATIVE MG/DL
HCT VFR BLD AUTO: 48.9 % (ref 36.6–50.3)
HGB BLD-MCNC: 14.6 G/DL (ref 12.1–17)
HGB UR QL STRIP: ABNORMAL
HYALINE CASTS URNS QL MICRO: ABNORMAL /LPF (ref 0–5)
KETONES UR QL STRIP.AUTO: NEGATIVE MG/DL
LEUKOCYTE ESTERASE UR QL STRIP.AUTO: NEGATIVE
MCH RBC QN AUTO: 25.5 PG (ref 26–34)
MCHC RBC AUTO-ENTMCNC: 29.9 G/DL (ref 30–36.5)
MCV RBC AUTO: 85.5 FL (ref 80–99)
NITRITE UR QL STRIP.AUTO: NEGATIVE
NRBC # BLD: 0 K/UL (ref 0–0.01)
NRBC BLD-RTO: 0 PER 100 WBC
PH UR STRIP: 5.5 (ref 5–8)
PLATELET # BLD AUTO: 258 K/UL (ref 150–400)
PMV BLD AUTO: 9.5 FL (ref 8.9–12.9)
PROT UR STRIP-MCNC: 30 MG/DL
RBC # BLD AUTO: 5.72 M/UL (ref 4.1–5.7)
RBC #/AREA URNS HPF: ABNORMAL /HPF (ref 0–5)
SP GR UR REFRACTOMETRY: 1.01 (ref 1–1.03)
UROBILINOGEN UR QL STRIP.AUTO: 0.2 EU/DL (ref 0.2–1)
WBC # BLD AUTO: 7.5 K/UL (ref 4.1–11.1)
WBC URNS QL MICRO: ABNORMAL /HPF (ref 0–4)

## 2023-08-01 PROCEDURE — 3075F SYST BP GE 130 - 139MM HG: CPT

## 2023-08-01 PROCEDURE — 3079F DIAST BP 80-89 MM HG: CPT

## 2023-08-01 PROCEDURE — 99214 OFFICE O/P EST MOD 30 MIN: CPT

## 2023-08-01 RX ORDER — LOSARTAN POTASSIUM 100 MG/1
100 TABLET ORAL DAILY
Qty: 90 TABLET | Refills: 3 | Status: SHIPPED | OUTPATIENT
Start: 2023-08-01

## 2023-08-01 RX ORDER — AMLODIPINE BESYLATE 5 MG/1
5 TABLET ORAL DAILY
Qty: 90 TABLET | Refills: 3 | Status: SHIPPED | OUTPATIENT
Start: 2023-08-01

## 2023-08-01 RX ORDER — ALLOPURINOL 100 MG/1
100 TABLET ORAL DAILY
Qty: 90 TABLET | Refills: 0 | Status: SHIPPED | OUTPATIENT
Start: 2023-08-01

## 2023-08-01 RX ORDER — ATORVASTATIN CALCIUM 40 MG/1
40 TABLET, FILM COATED ORAL DAILY
Qty: 90 TABLET | Refills: 0 | Status: SHIPPED | OUTPATIENT
Start: 2023-08-01

## 2023-08-01 SDOH — ECONOMIC STABILITY: INCOME INSECURITY: HOW HARD IS IT FOR YOU TO PAY FOR THE VERY BASICS LIKE FOOD, HOUSING, MEDICAL CARE, AND HEATING?: PATIENT DECLINED

## 2023-08-01 SDOH — ECONOMIC STABILITY: FOOD INSECURITY: WITHIN THE PAST 12 MONTHS, THE FOOD YOU BOUGHT JUST DIDN'T LAST AND YOU DIDN'T HAVE MONEY TO GET MORE.: PATIENT DECLINED

## 2023-08-01 SDOH — ECONOMIC STABILITY: FOOD INSECURITY: WITHIN THE PAST 12 MONTHS, YOU WORRIED THAT YOUR FOOD WOULD RUN OUT BEFORE YOU GOT MONEY TO BUY MORE.: PATIENT DECLINED

## 2023-08-01 SDOH — ECONOMIC STABILITY: HOUSING INSECURITY
IN THE LAST 12 MONTHS, WAS THERE A TIME WHEN YOU DID NOT HAVE A STEADY PLACE TO SLEEP OR SLEPT IN A SHELTER (INCLUDING NOW)?: PATIENT REFUSED

## 2023-08-01 ASSESSMENT — PATIENT HEALTH QUESTIONNAIRE - PHQ9
SUM OF ALL RESPONSES TO PHQ QUESTIONS 1-9: 0
1. LITTLE INTEREST OR PLEASURE IN DOING THINGS: 0
SUM OF ALL RESPONSES TO PHQ9 QUESTIONS 1 & 2: 0
SUM OF ALL RESPONSES TO PHQ QUESTIONS 1-9: 0
2. FEELING DOWN, DEPRESSED OR HOPELESS: 0

## 2023-08-01 ASSESSMENT — ENCOUNTER SYMPTOMS
ABDOMINAL DISTENTION: 0
VOMITING: 0
ABDOMINAL PAIN: 0
VOICE CHANGE: 0
TROUBLE SWALLOWING: 0
COLOR CHANGE: 0
SHORTNESS OF BREATH: 0
CHEST TIGHTNESS: 0
NAUSEA: 0

## 2023-08-02 LAB
ALBUMIN SERPL-MCNC: 3.6 G/DL (ref 3.5–5)
ALBUMIN/GLOB SERPL: 1 (ref 1.1–2.2)
ALP SERPL-CCNC: 117 U/L (ref 45–117)
ALT SERPL-CCNC: 19 U/L (ref 12–78)
ANION GAP SERPL CALC-SCNC: 5 MMOL/L (ref 5–15)
AST SERPL-CCNC: 13 U/L (ref 15–37)
BILIRUB SERPL-MCNC: 0.7 MG/DL (ref 0.2–1)
BUN SERPL-MCNC: 26 MG/DL (ref 6–20)
BUN/CREAT SERPL: 18 (ref 12–20)
CALCIUM SERPL-MCNC: 9.4 MG/DL (ref 8.5–10.1)
CHLORIDE SERPL-SCNC: 107 MMOL/L (ref 97–108)
CHOLEST SERPL-MCNC: 169 MG/DL
CO2 SERPL-SCNC: 27 MMOL/L (ref 21–32)
CREAT SERPL-MCNC: 1.46 MG/DL (ref 0.7–1.3)
CREAT UR-MCNC: 62.5 MG/DL
EST. AVERAGE GLUCOSE BLD GHB EST-MCNC: 117 MG/DL
GLOBULIN SER CALC-MCNC: 3.7 G/DL (ref 2–4)
GLUCOSE SERPL-MCNC: 106 MG/DL (ref 65–100)
HBA1C MFR BLD: 5.7 % (ref 4–5.6)
HDLC SERPL-MCNC: 46 MG/DL
HDLC SERPL: 3.7 (ref 0–5)
LDLC SERPL CALC-MCNC: 108 MG/DL (ref 0–100)
MICROALBUMIN UR-MCNC: 27.9 MG/DL
MICROALBUMIN/CREAT UR-RTO: 446 MG/G (ref 0–30)
POTASSIUM SERPL-SCNC: 5.2 MMOL/L (ref 3.5–5.1)
PROT SERPL-MCNC: 7.3 G/DL (ref 6.4–8.2)
SODIUM SERPL-SCNC: 139 MMOL/L (ref 136–145)
TRIGL SERPL-MCNC: 75 MG/DL
URATE SERPL-MCNC: 8.1 MG/DL (ref 3.5–7.2)
VLDLC SERPL CALC-MCNC: 15 MG/DL

## 2023-11-13 ASSESSMENT — ENCOUNTER SYMPTOMS
CHEST TIGHTNESS: 0
SHORTNESS OF BREATH: 0
ABDOMINAL PAIN: 0

## 2023-11-14 ENCOUNTER — OFFICE VISIT (OUTPATIENT)
Age: 63
End: 2023-11-14
Payer: COMMERCIAL

## 2023-11-14 VITALS
OXYGEN SATURATION: 96 % | SYSTOLIC BLOOD PRESSURE: 128 MMHG | HEART RATE: 80 BPM | WEIGHT: 261 LBS | TEMPERATURE: 97.2 F | RESPIRATION RATE: 16 BRPM | DIASTOLIC BLOOD PRESSURE: 88 MMHG | HEIGHT: 70 IN | BODY MASS INDEX: 37.37 KG/M2

## 2023-11-14 DIAGNOSIS — R39.12 POOR URINARY STREAM: ICD-10-CM

## 2023-11-14 DIAGNOSIS — R73.03 PREDIABETES: ICD-10-CM

## 2023-11-14 DIAGNOSIS — I10 PRIMARY HYPERTENSION: Primary | ICD-10-CM

## 2023-11-14 DIAGNOSIS — R35.1 NOCTURIA: ICD-10-CM

## 2023-11-14 PROCEDURE — 3074F SYST BP LT 130 MM HG: CPT

## 2023-11-14 PROCEDURE — 99214 OFFICE O/P EST MOD 30 MIN: CPT

## 2023-11-14 PROCEDURE — 3079F DIAST BP 80-89 MM HG: CPT

## 2023-11-14 RX ORDER — TAMSULOSIN HYDROCHLORIDE 0.4 MG/1
0.4 CAPSULE ORAL DAILY
Qty: 30 CAPSULE | Refills: 3 | Status: SHIPPED | OUTPATIENT
Start: 2023-11-14

## 2023-11-14 ASSESSMENT — PATIENT HEALTH QUESTIONNAIRE - PHQ9
SUM OF ALL RESPONSES TO PHQ QUESTIONS 1-9: 0
1. LITTLE INTEREST OR PLEASURE IN DOING THINGS: 0
SUM OF ALL RESPONSES TO PHQ QUESTIONS 1-9: 0
SUM OF ALL RESPONSES TO PHQ9 QUESTIONS 1 & 2: 0
2. FEELING DOWN, DEPRESSED OR HOPELESS: 0
SUM OF ALL RESPONSES TO PHQ QUESTIONS 1-9: 0
SUM OF ALL RESPONSES TO PHQ QUESTIONS 1-9: 0

## 2023-11-16 ENCOUNTER — CLINICAL DOCUMENTATION (OUTPATIENT)
Age: 63
End: 2023-11-16

## 2023-11-17 ENCOUNTER — NURSE ONLY (OUTPATIENT)
Age: 63
End: 2023-11-17

## 2023-11-17 DIAGNOSIS — R39.12 POOR URINARY STREAM: ICD-10-CM

## 2023-11-17 DIAGNOSIS — R35.1 NOCTURIA: ICD-10-CM

## 2023-11-17 DIAGNOSIS — R73.03 PREDIABETES: ICD-10-CM

## 2023-11-18 LAB
EST. AVERAGE GLUCOSE BLD GHB EST-MCNC: 114 MG/DL
HBA1C MFR BLD: 5.6 % (ref 4–5.6)

## 2023-11-21 LAB
PSA SERPL-MCNC: 1 NG/ML (ref 0–4)
REFLEX CRITERIA: NORMAL

## 2024-01-26 DIAGNOSIS — R35.1 NOCTURIA: ICD-10-CM

## 2024-01-26 DIAGNOSIS — R39.12 POOR URINARY STREAM: ICD-10-CM

## 2024-01-26 DIAGNOSIS — Z00.00 LABORATORY TESTS ORDERED AS PART OF A COMPLETE PHYSICAL EXAM (CPE): ICD-10-CM

## 2024-01-26 RX ORDER — ATORVASTATIN CALCIUM 40 MG/1
40 TABLET, FILM COATED ORAL DAILY
Qty: 90 TABLET | Refills: 0 | Status: SHIPPED | OUTPATIENT
Start: 2024-01-26

## 2024-01-26 RX ORDER — TAMSULOSIN HYDROCHLORIDE 0.4 MG/1
0.4 CAPSULE ORAL DAILY
Qty: 30 CAPSULE | Refills: 3 | Status: SHIPPED | OUTPATIENT
Start: 2024-01-26

## 2024-05-19 DIAGNOSIS — R39.12 POOR URINARY STREAM: ICD-10-CM

## 2024-05-19 DIAGNOSIS — R35.1 NOCTURIA: ICD-10-CM

## 2024-05-21 RX ORDER — TAMSULOSIN HYDROCHLORIDE 0.4 MG/1
0.4 CAPSULE ORAL DAILY
Qty: 90 CAPSULE | Refills: 3 | Status: SHIPPED | OUTPATIENT
Start: 2024-05-21

## 2024-08-03 DIAGNOSIS — Z00.00 LABORATORY TESTS ORDERED AS PART OF A COMPLETE PHYSICAL EXAM (CPE): ICD-10-CM

## 2024-08-06 DIAGNOSIS — Z00.00 LABORATORY TESTS ORDERED AS PART OF A COMPLETE PHYSICAL EXAM (CPE): ICD-10-CM

## 2024-08-06 NOTE — TELEPHONE ENCOUNTER
Medication Refill Request    Louie Clemons is requesting a refill of the following medication(s):   Requested Prescriptions     Pending Prescriptions Disp Refills    atorvastatin (LIPITOR) 40 MG tablet [Pharmacy Med Name: ATORVASTATIN 40MG TABLETS] 90 tablet 0     Sig: TAKE 1 TABLET BY MOUTH DAILY        Listed PCP is Kera Simon MD   Last provider to prescribe medication: Kristyn Stack MD  Last Date of Medication Prescribed: 01/26/2024   Date of Last Office Visit at [facility]: 11/14/2023   FUTURE APPOINTMENT: No future appointments.    Please send refill to:    [facility] #[id number] [place], [place] - [address] - P [phone] - F [phone] [address]  [facility] [address]  Phone: [phone] Fax: [phone]      Please review request and approve or deny with recommendations.

## 2024-08-07 RX ORDER — ATORVASTATIN CALCIUM 40 MG/1
40 TABLET, FILM COATED ORAL DAILY
Qty: 90 TABLET | Refills: 0 | Status: SHIPPED | OUTPATIENT
Start: 2024-08-07

## 2024-08-10 RX ORDER — ATORVASTATIN CALCIUM 40 MG/1
40 TABLET, FILM COATED ORAL DAILY
Qty: 90 TABLET | Refills: 0 | OUTPATIENT
Start: 2024-08-10

## 2024-09-21 DIAGNOSIS — I12.9 HYPERTENSION WITH RENAL DISEASE: ICD-10-CM

## 2024-09-21 DIAGNOSIS — R80.9 MICROALBUMINURIA: ICD-10-CM

## 2024-09-21 DIAGNOSIS — I10 PRIMARY HYPERTENSION: ICD-10-CM

## 2024-09-23 RX ORDER — LOSARTAN POTASSIUM 100 MG/1
100 TABLET ORAL DAILY
Qty: 90 TABLET | Refills: 2 | Status: SHIPPED | OUTPATIENT
Start: 2024-09-23

## 2024-09-23 NOTE — TELEPHONE ENCOUNTER
Medication Refill Request    Louie Clemons is requesting a refill of the following medication(s):   Requested Prescriptions     Pending Prescriptions Disp Refills    losartan (COZAAR) 100 MG tablet [Pharmacy Med Name: LOSARTAN 100MG TABLETS] 90 tablet      Sig: TAKE 1 TABLET BY MOUTH DAILY        Listed PCP is Kera Simon MD   Last provider to prescribe medication: Alfred  Last Date of Medication Prescribed:  10/22/2023  Date of Last Office Visit at [facility]: 11/14/2023   FUTURE APPOINTMENT: No future appointments.    Please send refill to:    [email or web address] #[id number] [place], [place] - [address] - P [phone] - F [phone]  [facility]  [facility] [address]  Phone: [phone] Fax: [phone]      Please review request and approve or deny with recommendations.

## 2024-10-22 DIAGNOSIS — Z00.00 LABORATORY TESTS ORDERED AS PART OF A COMPLETE PHYSICAL EXAM (CPE): ICD-10-CM

## 2024-10-22 RX ORDER — ATORVASTATIN CALCIUM 40 MG/1
40 TABLET, FILM COATED ORAL DAILY
Qty: 90 TABLET | Refills: 0 | OUTPATIENT
Start: 2024-10-22

## 2024-10-22 NOTE — TELEPHONE ENCOUNTER
Medication Refill Request    Louie Clemons is requesting a refill of the following medication(s):   Requested Prescriptions     Pending Prescriptions Disp Refills    amLODIPine (NORVASC) 5 MG tablet [Pharmacy Med Name: AMLODIPINE BESYLATE 5MG TABLETS] 90 tablet 3     Sig: TAKE 1 TABLET BY MOUTH DAILY        Listed PCP is Kera Simon MD   Last provider to prescribe medication: Alfred  Last Date of Medication Prescribed:  08/01/2023  Date of Last Office Visit at [facility]:  11/14/2023  FUTURE APPOINTMENT: No future appointments.    Please send refill to:    [facility] #[id number] [place], [place] - [address] - P [phone] - F [phone]  [facility] [address]  Phone: [phone] Fax: [phone]      Please review request and approve or deny with recommendations.

## 2024-10-23 DIAGNOSIS — Z00.00 LABORATORY TESTS ORDERED AS PART OF A COMPLETE PHYSICAL EXAM (CPE): ICD-10-CM

## 2024-10-23 RX ORDER — ATORVASTATIN CALCIUM 40 MG/1
40 TABLET, FILM COATED ORAL DAILY
Qty: 90 TABLET | OUTPATIENT
Start: 2024-10-23

## 2024-10-23 RX ORDER — ATORVASTATIN CALCIUM 40 MG/1
40 TABLET, FILM COATED ORAL DAILY
Qty: 30 TABLET | Refills: 0 | Status: SHIPPED | OUTPATIENT
Start: 2024-10-23

## 2024-10-23 RX ORDER — AMLODIPINE BESYLATE 5 MG/1
5 TABLET ORAL DAILY
Qty: 30 TABLET | Refills: 0 | Status: SHIPPED | OUTPATIENT
Start: 2024-10-23

## 2024-10-23 RX ORDER — AMLODIPINE BESYLATE 5 MG/1
5 TABLET ORAL DAILY
Qty: 90 TABLET | OUTPATIENT
Start: 2024-10-23

## 2024-11-21 DIAGNOSIS — Z00.00 LABORATORY TESTS ORDERED AS PART OF A COMPLETE PHYSICAL EXAM (CPE): ICD-10-CM

## 2024-11-21 NOTE — TELEPHONE ENCOUNTER
Medication Refill Request    Louie Clemons is requesting a refill of the following medication(s):   Requested Prescriptions     Pending Prescriptions Disp Refills    amLODIPine (NORVASC) 5 MG tablet [Pharmacy Med Name: AMLODIPINE BESYLATE 5MG TABLETS] 90 tablet 3     Sig: TAKE 1 TABLET BY MOUTH DAILY        Listed PCP is Kera Simon MD   Last provider to prescribe medication: Alfred  Last Date of Medication Prescribed:  10/23/2024  Date of Last Office Visit at [facility]:  11/14/2023  FUTURE APPOINTMENT: No future appointments.    Please send refill to:    [facility] #[id number] [place], [place] - [address] - P [phone] - F [phone]  [facility] [address]  Phone: [phone] Fax: [phone]      Please review request and approve or deny with recommendations.

## 2024-11-23 RX ORDER — AMLODIPINE BESYLATE 5 MG/1
5 TABLET ORAL DAILY
Qty: 30 TABLET | Refills: 0 | OUTPATIENT
Start: 2024-11-23

## 2024-12-11 DIAGNOSIS — M1A.9XX0 CHRONIC GOUT INVOLVING TOE WITHOUT TOPHUS, UNSPECIFIED CAUSE, UNSPECIFIED LATERALITY: ICD-10-CM

## 2024-12-11 NOTE — TELEPHONE ENCOUNTER
Medication Refill Request    Louie Clemons is requesting a refill of the following medication(s):   Requested Prescriptions     Pending Prescriptions Disp Refills    allopurinol (ZYLOPRIM) 100 MG tablet [Pharmacy Med Name: ALLOPURINOL 100MG TABLETS] 30 tablet 0     Sig: TAKE 1 TABLET BY MOUTH DAILY        Listed PCP is Kera Simon MD   Last provider to prescribe medication: Alfred  Last Date of Medication Prescribed:  10/22/2023  Date of Last Office Visit at [facility]:  11/14/2023  FUTURE APPOINTMENT: No future appointments.    Please send refill to:    [facility] #[id number] [place], [place] - [address] - P [phone] - F [phone] [address]  [facility] [address]  Phone: [phone] Fax: [phone]      Please review request and approve or deny with recommendations.

## 2024-12-12 RX ORDER — ALLOPURINOL 100 MG/1
100 TABLET ORAL DAILY
Qty: 30 TABLET | Refills: 0 | OUTPATIENT
Start: 2024-12-12

## 2024-12-23 DIAGNOSIS — Z00.00 LABORATORY TESTS ORDERED AS PART OF A COMPLETE PHYSICAL EXAM (CPE): ICD-10-CM

## 2024-12-24 NOTE — TELEPHONE ENCOUNTER
Medication Refill Request    Louie Clemosn is requesting a refill of the following medication(s):   Requested Prescriptions     Pending Prescriptions Disp Refills    atorvastatin (LIPITOR) 40 MG tablet [Pharmacy Med Name: ATORVASTATIN 40MG TABLETS] 30 tablet 0     Sig: TAKE 1 TABLET BY MOUTH DAILY        Listed PCP is Kera Simon MD   Last provider to prescribe medication: Alfred  Last Date of Medication Prescribed:  10/23/2024  Date of Last Office Visit at [facility]:  11/14/2023  FUTURE APPOINTMENT: No future appointments.    Please send refill to:    [facility] #[id number] [place], [place] - [address] - P [phone] - F [phone]  [address] [place], [place] [address] [facility]  [facility] [address]  Phone: [phone] Fax: [phone]      Please review request and approve or deny with recommendations.

## 2024-12-26 RX ORDER — ATORVASTATIN CALCIUM 40 MG/1
40 TABLET, FILM COATED ORAL DAILY
Qty: 30 TABLET | Refills: 0 | OUTPATIENT
Start: 2024-12-26

## 2025-01-06 NOTE — TELEPHONE ENCOUNTER
----- Message from Sofia Oakes MD sent at 3/29/2021  8:35 AM EDT -----  I have completed the telemed visit. Please register and check in patient. Please schedule lab apt for patient. Labs have been ordered. Please schedule follow up telemed apt with me or another provider in about 2 weeks.      Thank you,  Marixa Cuevas
Called and scheduled pt for labs and vv follow up .      Closed enc
Mental status is at baseline.      Comments: Strength is 5 out of 5 throughout.   Psychiatric:         Mood and Affect: Mood normal.         Behavior: Behavior normal.            Lab Results   Component Value Date    WBC 6.9 07/19/2023    HGB 13.6 07/19/2023    HCT 42.6 07/19/2023    .4 (H) 07/19/2023     07/19/2023     Lab Results   Component Value Date     04/09/2024    K 4.7 04/09/2024     04/09/2024    CO2 28 04/09/2024    BUN 19 04/09/2024    CREATININE 0.7 04/09/2024    GLUCOSE 130 (H) 04/09/2024    CALCIUM 9.1 04/09/2024    BILITOT 0.4 04/09/2024    ALKPHOS 84 04/09/2024    AST 30 04/09/2024    ALT 18 04/09/2024    LABGLOM >90 04/09/2024    GFRAA >60 09/16/2022     Lab Results   Component Value Date    JADYN NEGATIVE 09/16/2022     No components found for: \"RHEUMFACTOR\"  Lab Results   Component Value Date    SEDRATE 7 04/24/2024     Lab Results   Component Value Date    CRP <3.0 04/24/2024            An electronic signature was used to authenticate this note.    This note was generated with a voice recognition dictation system. Please disregard any errors or omission which have escaped my review.    --Helio Craig,

## 2025-04-13 DIAGNOSIS — Z00.00 LABORATORY TESTS ORDERED AS PART OF A COMPLETE PHYSICAL EXAM (CPE): ICD-10-CM

## 2025-04-14 RX ORDER — ATORVASTATIN CALCIUM 40 MG/1
40 TABLET, FILM COATED ORAL DAILY
Qty: 30 TABLET | Refills: 0 | OUTPATIENT
Start: 2025-04-14

## 2025-04-23 NOTE — TELEPHONE ENCOUNTER
, they stated his appointment is 1/31/18 at 1:50pm   With Dr. Roger Tamez   Fax is [phone] [place] - End[facility]  Discharge Note  Short Stay    Procedure(s) (LRB):  COLONOSCOPY, WITH POLYPECTOMY USING SNARE (N/A)      OUTCOME: Patient tolerated treatment/procedure well without complication and is now ready for discharge.    DISPOSITION: Home or Self Care    FINAL DIAGNOSIS:  Cecal polyp    FOLLOWUP: In clinic    DISCHARGE INSTRUCTIONS:    Discharge Procedure Orders   Diet Adult Regular     No driving until:   Order Comments: 24 hours after procedure     Notify your health care provider if you experience any of the following:  temperature >100.4     Notify your health care provider if you experience any of the following:  persistent nausea and vomiting or diarrhea     Notify your health care provider if you experience any of the following:  severe uncontrolled pain     Activity as tolerated        TIME SPENT ON DISCHARGE: 5 minutes

## (undated) DEVICE — SUTURE MCRYL SZ 4-0 L27IN ABSRB UD L19MM PS-2 1/2 CIR PRIM Y426H

## (undated) DEVICE — SUTURE ABSRB L30CM 2-0 VLT SPRL PDS + STRATAFIX SXPP1B410

## (undated) DEVICE — (D)PREP SKN CHLRAPRP APPL 26ML -- CONVERT TO ITEM 371833

## (undated) DEVICE — STRAP,POSITIONING,KNEE/BODY,FOAM,4X60": Brand: MEDLINE

## (undated) DEVICE — STERILE POLYISOPRENE POWDER-FREE SURGICAL GLOVES: Brand: PROTEXIS

## (undated) DEVICE — INFECTION CONTROL KIT SYS

## (undated) DEVICE — SPECIMEN RETRIEVAL POUCH: Brand: ENDO CATCH GOLD

## (undated) DEVICE — 3M™ IOBAN™ 2 ANTIMICROBIAL INCISE DRAPE 6650EZ: Brand: IOBAN™ 2

## (undated) DEVICE — NEEDLE HYPO 22GA L1.5IN BLK S STL HUB POLYPR SHLD REG BVL

## (undated) DEVICE — SURGICAL PROCEDURE KIT GEN LAPAROSCOPY LF

## (undated) DEVICE — TOWEL SURG W17XL27IN STD BLU COT NONFENESTRATED PREWASHED

## (undated) DEVICE — COVER LT HNDL PLAS RIG 1 PER PK

## (undated) DEVICE — 3M™ MEDIPORE™ H SOFT CLOTH TAPE SHORT ROLL TAPE 6INCHES X 2 YARDS 16 ROLLS/CASE 2866S: Brand: 3M™ MEDIPORE™

## (undated) DEVICE — ELECTRO LUBE IS A SINGLE PATIENT USE DEVICE THAT IS INTENDED TO BE USED ON ELECTROSURGICAL ELECTRODES TO REDUCE STICKING.: Brand: KEY SURGICAL ELECTRO LUBE

## (undated) DEVICE — Device

## (undated) DEVICE — TROCAR SITE CLOSURE DEVICE: Brand: ENDO CLOSE

## (undated) DEVICE — REM POLYHESIVE ADULT PATIENT RETURN ELECTRODE: Brand: VALLEYLAB

## (undated) DEVICE — VISUALIZATION SYSTEM: Brand: CLEARIFY

## (undated) DEVICE — BLADELESS OPTICAL TROCAR WITH FIXATION CANNULA: Brand: VERSAONE

## (undated) DEVICE — ARM DRAPE

## (undated) DEVICE — TRAY CATH 16F DRN BG LTX -- CONVERT TO ITEM 363158

## (undated) DEVICE — SOL IRRIGATION INJ NACL 0.9% 500ML BTL

## (undated) DEVICE — BANDAGE ADH W0.75XL3IN NAT PLAS CURAD

## (undated) DEVICE — OBTRTR BLDELSS 8MM DISP -- DA VINCI - SNGL USE

## (undated) DEVICE — AIRSEAL BIFURCATED SMOKE EVAC FILTERED TUBE SET: Brand: AIRSEAL

## (undated) DEVICE — INSTRMT SET WND CLSR SUT PASS --

## (undated) DEVICE — PAD 05IN BASE 3IN PEAK M DENS CONVOLUTED FOAM

## (undated) DEVICE — DEVICE TRNSF SPIK STL 2008S] MICROTEK MEDICAL INC]

## (undated) DEVICE — SUTURE SZ 0 27IN 5/8 CIR UR-6  TAPER PT VIOLET ABSRB VICRYL J603H

## (undated) DEVICE — 3000CC GUARDIAN II: Brand: GUARDIAN

## (undated) DEVICE — DRAPE,REIN 53X77,STERILE: Brand: MEDLINE

## (undated) DEVICE — STRIP,CLOSURE,WOUND,MEDI-STRIP,1/2X4: Brand: MEDLINE

## (undated) DEVICE — SEAL UNIV 5-8MM DISP BX/10 -- DA VINCI XI - SNGL USE

## (undated) DEVICE — SUTURE STRATAFIX SYMMETRIC SZ 1 L18IN ABSRB VLT CT1 L36CM SXPP1A404

## (undated) DEVICE — CLICKLINE SCISSORS INSERT: Brand: CLICKLINE

## (undated) DEVICE — BLADELESS OPTICAL TROCAR WITH FIXATION CANNULA: Brand: VERSAPORT

## (undated) DEVICE — TIP COVER ACCESSORY